# Patient Record
Sex: FEMALE | Race: AMERICAN INDIAN OR ALASKA NATIVE | NOT HISPANIC OR LATINO | Employment: OTHER | URBAN - METROPOLITAN AREA
[De-identification: names, ages, dates, MRNs, and addresses within clinical notes are randomized per-mention and may not be internally consistent; named-entity substitution may affect disease eponyms.]

---

## 2018-11-23 ENCOUNTER — APPOINTMENT (OUTPATIENT)
Dept: RADIOLOGY | Facility: MEDICAL CENTER | Age: 63
End: 2018-11-23
Attending: EMERGENCY MEDICINE
Payer: MEDICARE

## 2018-11-23 ENCOUNTER — HOSPITAL ENCOUNTER (OUTPATIENT)
Dept: RADIOLOGY | Facility: MEDICAL CENTER | Age: 63
End: 2018-11-23

## 2018-11-23 ENCOUNTER — HOSPITAL ENCOUNTER (EMERGENCY)
Facility: MEDICAL CENTER | Age: 63
End: 2018-11-23
Attending: EMERGENCY MEDICINE
Payer: MEDICARE

## 2018-11-23 VITALS
OXYGEN SATURATION: 100 % | RESPIRATION RATE: 10 BRPM | TEMPERATURE: 98 F | SYSTOLIC BLOOD PRESSURE: 97 MMHG | DIASTOLIC BLOOD PRESSURE: 63 MMHG | BODY MASS INDEX: 19.83 KG/M2 | WEIGHT: 105 LBS | HEIGHT: 61 IN | HEART RATE: 81 BPM

## 2018-11-23 DIAGNOSIS — S53.104A CLOSED DISLOCATION OF RIGHT ELBOW, INITIAL ENCOUNTER: ICD-10-CM

## 2018-11-23 PROCEDURE — 96375 TX/PRO/DX INJ NEW DRUG ADDON: CPT

## 2018-11-23 PROCEDURE — 24600 TX CLSD ELBOW DISLC W/O ANES: CPT

## 2018-11-23 PROCEDURE — 73070 X-RAY EXAM OF ELBOW: CPT | Mod: RT

## 2018-11-23 PROCEDURE — 302875 HCHG BANDAGE ACE 4 OR 6""

## 2018-11-23 PROCEDURE — 96365 THER/PROPH/DIAG IV INF INIT: CPT

## 2018-11-23 PROCEDURE — 29105 APPLICATION LONG ARM SPLINT: CPT

## 2018-11-23 PROCEDURE — 700111 HCHG RX REV CODE 636 W/ 250 OVERRIDE (IP): Performed by: EMERGENCY MEDICINE

## 2018-11-23 PROCEDURE — 99285 EMERGENCY DEPT VISIT HI MDM: CPT

## 2018-11-23 PROCEDURE — 700105 HCHG RX REV CODE 258: Performed by: EMERGENCY MEDICINE

## 2018-11-23 RX ORDER — AMITRIPTYLINE HYDROCHLORIDE 100 MG/1
100 TABLET ORAL NIGHTLY
COMMUNITY

## 2018-11-23 RX ORDER — GABAPENTIN 100 MG/1
100 CAPSULE ORAL 3 TIMES DAILY
COMMUNITY

## 2018-11-23 RX ORDER — SODIUM CHLORIDE 9 MG/ML
1000 INJECTION, SOLUTION INTRAVENOUS ONCE
Status: COMPLETED | OUTPATIENT
Start: 2018-11-23 | End: 2018-11-23

## 2018-11-23 RX ORDER — MIDAZOLAM HYDROCHLORIDE 1 MG/ML
INJECTION INTRAMUSCULAR; INTRAVENOUS
Status: DISCONTINUED
Start: 2018-11-23 | End: 2018-11-23

## 2018-11-23 RX ORDER — MIDAZOLAM HYDROCHLORIDE 1 MG/ML
INJECTION INTRAMUSCULAR; INTRAVENOUS
Status: DISCONTINUED
Start: 2018-11-23 | End: 2018-11-24 | Stop reason: HOSPADM

## 2018-11-23 RX ORDER — CITALOPRAM 20 MG/1
20 TABLET ORAL DAILY
COMMUNITY

## 2018-11-23 RX ORDER — MIDAZOLAM HYDROCHLORIDE 1 MG/ML
5 INJECTION INTRAMUSCULAR; INTRAVENOUS ONCE
Status: COMPLETED | OUTPATIENT
Start: 2018-11-23 | End: 2018-11-23

## 2018-11-23 RX ORDER — FAMOTIDINE 20 MG/1
20 TABLET, FILM COATED ORAL 2 TIMES DAILY
COMMUNITY

## 2018-11-23 RX ADMIN — FENTANYL CITRATE 50 MCG: 50 INJECTION, SOLUTION INTRAMUSCULAR; INTRAVENOUS at 20:43

## 2018-11-23 RX ADMIN — PROPOFOL 20 MG: 10 INJECTION, EMULSION INTRAVENOUS at 21:00

## 2018-11-23 RX ADMIN — FENTANYL CITRATE 50 MCG: 50 INJECTION, SOLUTION INTRAMUSCULAR; INTRAVENOUS at 20:57

## 2018-11-23 RX ADMIN — SODIUM CHLORIDE 1000 ML: 9 INJECTION, SOLUTION INTRAVENOUS at 20:41

## 2018-11-23 RX ADMIN — MIDAZOLAM 5 MG: 1 INJECTION INTRAMUSCULAR; INTRAVENOUS at 20:59

## 2018-11-23 ASSESSMENT — PAIN SCALES - GENERAL
PAINLEVEL_OUTOF10: 8
PAINLEVEL_OUTOF10: 8

## 2018-11-24 NOTE — H&P
Surgery Orthopedic History & Physical Note    Date  11/23/2018    Primary Care Physician  No primary care provider on file.    CC  Right elbow dislocation    HPI  This is a 63 y.o. Right hand dominant female who presented as a transfer from [facility] with a right elbow dislocation. Reports a fall yesterday, noticed pain and discomfort today and presented to ED in [place]. Upon questioning patient is unsure of exact time frame of injury. Attempts at closed reduction were unsuccessful and patient was transferred to [facility] for further eval. Currently c/o right elbow pain, denies numbness/tingling. Admits > 10 alcoholic drinks per week, usually in binge fashion. Denies tobacco use or ilicit drugs. Also reports left hand pain,     Past Medical History:   Diagnosis Date   • Nerve injury     Unspecified back and head nerve damage       History reviewed. No pertinent surgical history.    Current Facility-Administered Medications   Medication Dose Route Frequency Provider Last Rate Last Dose   • NS infusion 1,000 mL  1,000 mL Intravenous Once William Foster M.D.       • propofol (DIPRIVAN) injection  40 mcg/kg/min Intravenous Once William Foster M.D.       • fentaNYL (SUBLIMAZE) injection 50 mcg  50 mcg Intravenous Once William Foster M.D.         Current Outpatient Prescriptions   Medication Sig Dispense Refill   • gabapentin (NEURONTIN) 100 MG Cap Take 100 mg by mouth 3 times a day.     • famotidine (PEPCID) 20 MG Tab Take 20 mg by mouth 2 times a day.     • citalopram (CELEXA) 20 MG Tab Take 20 mg by mouth every day.     • amitriptyline (ELAVIL) 100 MG Tab Take 100 mg by mouth every evening.         Social History     Social History   • Marital status: N/A     Spouse name: N/A   • Number of children: N/A   • Years of education: N/A     Occupational History   • Not on file.     Social History Main Topics   • Smoking status: Current Some Day Smoker   • Smokeless tobacco: Current User   • Alcohol use No       Comment: social   • Drug use: No   • Sexual activity: Not on file     Other Topics Concern   • Not on file     Social History Narrative   • No narrative on file       History reviewed. No pertinent family history.    Allergies  Latex and Sulfur    Review of Systems  Negative except for HPI    Physical Exam    Vital Signs  Blood Pressure : (!) 97/63   Temperature: 36.7 °C (98 °F)   Pulse: 90   Respiration: 18         NAD  A & O x 3  Right UE:  Elbow with obvious deformity c/w posterior dislocation  + wrist flexion/extension, digits flex/extend.   SILT median/radial/ulnar  +radial pulse  No open wound noted  Left hand without significant tenderness.   Labs:                    Radiology:  OUTSIDE IMAGES-DX LOWER EXTREMITY, RIGHT   Final Result      OUTSIDE IMAGES-DX UPPER EXTREMITY, LEFT   Final Result      OUTSIDE IMAGES-DX UPPER EXTREMITY, RIGHT   Final Result      OUTSIDE IMAGES-DX UPPER EXTREMITY, RIGHT   Final Result            Assessment/Plan:  Right elbow posterior dislocation after fall ~24 hours ago or longer; will attempt closed reduction under sedation in ED. Outside films do show possible coronoid avulsion. If successful will plan for posterior mold splint and sling. If unsuccessful will plan for admission and open reduction in OR tomorrow am. Explained R/B/A to patient, who expresses understanding. Patient also has acute vs. Chronic left 3rd metacarpal fracture. No swelling or tenderness noted.       Addendum:  Post reduction films show reduction of posterior elbow dislocation. Patient placed in posterior splint and will be discharged from ED. Follow up @ [facility] in 10-14 days. NWB RUE. Can be weight bearing as tolerated on left hand, allow pain to dictate activity.

## 2018-11-24 NOTE — ED PROVIDER NOTES
ED Provider Note    CHIEF COMPLAINT  Chief Complaint   Patient presents with   • Arm Injury     Right forearm and elbow fracture.  Left hand fx.  Pt is a transfer from [facility].  Reduction attempt failed at previous facility       HPI  Vanessa Wong is a 63 y.o. female who presents via transfer for evaluation of a right elbow dislocation, yesterday morning about 36 hours ago the patient fell while reorganizing her garage, landed on the floor, there is a fall from approximately 5 feet or so.  Had immediate pain in the elbow as well as left hand, waited a day to go to the emergency department where she was diagnosed with the elbow dislocation and possible avulsion fracture within the joint space.  There was an unsuccessful attempt at reduction there at the outside facility so she was sent here for orthopedic consultation.  The x-ray revealed a left fifth metacarpal fracture as well, she has no head injury or neck pain or abdominal pain or chest pain, no other injuries.  She last ate this morning approximately 12 hours ago although 4 hours ago she had some water.    REVIEW OF SYSTEMS  Negative for headache, acute neck pain, acute back pain, chest pain, abdominal pain, weakness, numbness or tingling.  All other systems are negative.    PAST MEDICAL HISTORY  Past Medical History:   Diagnosis Date   • Nerve injury     Unspecified back and head nerve damage       FAMILY HISTORY  History reviewed. No pertinent family history.    SOCIAL HISTORY  Social History   Substance Use Topics   • Smoking status: Current Some Day Smoker   • Smokeless tobacco: Current User   • Alcohol use No      Comment: social       SURGICAL HISTORY  History reviewed. No pertinent surgical history.    CURRENT MEDICATIONS  I personally reviewed the medication list in the charting documentation.     ALLERGIES  Allergies   Allergen Reactions   • Latex    • Sulfur        MEDICAL RECORD  I have reviewed patient's medical record and pertinent results  "are listed above.      PHYSICAL EXAM  VITAL SIGNS: BP (!) 97/63   Pulse 90   Temp 36.7 °C (98 °F) (Temporal)   Resp 18   Ht 1.549 m (5' 1\")   Wt 47.6 kg (105 lb)   BMI 19.84 kg/m²    Constitutional: Well appearing patient in no acute distress.  Not toxic, nor ill in appearance.  HENT: Mucus membranes moist.    Eyes: No scleral icterus. Normal conjunctiva   Neck: Supple, comfortable, nonpainful range of motion.   Cardiovascular: Regular heart rate and rhythm.   Thorax & Lungs: Chest is nontender.  Lungs are clear to auscultation with good air movement bilaterally.  No wheeze, rhonchi, nor rales.   Abdomen: Soft, with no tenderness, rebound nor guarding.  No mass or pulsatile mass appreciated.  Skin: Warm, dry. No rash appreciated  Extremities/Musculoskeletal: Right upper extremity has a long posterior splint in place, normal sensation in all III nerve distributions as well as cap refill appreciated.  The left hand has appreciable edema and tenderness overlying the ulnar aspect, neurovascularly intact distally  Neurologic: Alert & oriented. No focal deficits observed.   Psychiatric: Normal affect appropriate for the clinical situation.    DIAGNOSTIC STUDIES / PROCEDURES    RADIOLOGY  DX-ELBOW-LIMITED 2- RIGHT   Final Result         1. Successful air reduction of posterior elbow dislocation.   2. Several tiny avulsion fracture fragments anterior to the distal humerus.      OUTSIDE IMAGES-DX LOWER EXTREMITY, RIGHT   Final Result      OUTSIDE IMAGES-DX UPPER EXTREMITY, LEFT   Final Result      OUTSIDE IMAGES-DX UPPER EXTREMITY, RIGHT   Final Result      OUTSIDE IMAGES-DX UPPER EXTREMITY, RIGHT   Final Result              Conscious Sedation Procedure Note    Indication: Elbow dislocation    Consent: I have discussed with the patient and/or the patient representative the indication, alternatives, and the possible risks and/or complications of the planned procedure and the anesthesia methods. The patient and/or " patient representative appear to understand and agree to proceed.    Physician Involvement: The attending physician was present and supervising this procedure.    Pre-Sedation Documentation and Exam: I have personally completed a history, physical exam & review of systems for this patient (see notes).  Airway Assessment: Mallampati Class II - (soft palate, fauces & uvula are visible)    Prior History of Anesthesia Complications: none    ASA Classification: Class 2 - A normal healthy patient with mild systemic disease    Sedation/ Anesthesia Plan: intravenous sedation    Medications Used: Primarily fentanyl and Versed with small amount of propofol    Monitoring and Safety: The patient was placed on a cardiac monitor and vital signs, pulse oximetry and level of consciousness were continuously evaluated throughout the procedure. The patient was closely monitored until recovery from the medications was complete and the patient had returned to baseline status. Respiratory therapy was on standby at all times during the procedure.    (The following sections must be completed)  Post-Sedation Vital Signs: Vital signs were reviewed and were stable after the procedure (see flow sheet for vitals)            Post-Sedation Exam: Lungs: clear and Cardiovascular: normal           Complications: none    Total time of the bedside: 24 minutes        COURSE & MEDICAL DECISION MAKING  I have reviewed any medical record information, laboratory studies and radiographic results as noted above.    Encounter Summary: This is a 63 y.o. female with a right elbow dislocation, transferred here after an unsuccessful reduction tap at the outside facility, she actually fell about 36 hours ago leading to this injury.  Has a contralateral hand fracture, she is neurovascularly intact with no other injuries, I consulted Dr. Francis, orthopedics who will come evaluate the patient, the plan as of now is a bedside conscious sedation as well as reduction  ------ conscious sedation proceeded uneventfully with successful reduction of the elbow confirmed by x-ray, patient has been reevaluated and is awake and alert and speaking, she is being discharged home once a ride arrives to drive her she will follow-up with the orthopedic surgeon.      DISPOSITION: Discharged home in stable condition      FINAL IMPRESSION  1. Closed dislocation of right elbow, initial encounter           This dictation was created using voice recognition software. The accuracy of the dictation is limited to the abilities of the software. I expect there may be some errors of grammar and possibly content. The nursing notes were reviewed and certain aspects of this information were incorporated into this note.    Electronically signed by: William Foster, 11/23/2018 8:28 PM

## 2018-11-24 NOTE — ED NOTES
Assist RN: Elbow reduction performed at bedside by Dr. Fracnis with Dr. Foster directing conscious sedation. Medications titrated by ERP. Procedure began at 2056. Pt tolerated well, and vital signs remained stable. RT at BS. Tech splinting elbow. XRAY called.

## 2018-11-24 NOTE — OP REPORT
DATE OF SERVICE:  11/23/2018    PREOPERATIVE DIAGNOSIS:  Right elbow posterior dislocation.    POSTOPERATIVE DIAGNOSIS:  Right elbow posterior dislocation.    PROCEDURE PERFORMED:  Right elbow closed reduction under anesthesia/conscious   sedation.    ESTIMATED BLOOD LOSS:  None.    SURGEON:  Torito Francis MD    ASSISTANT:  William Foster MD    INDICATIONS FOR PROCEDURE:  This patient is a 63-year-old female transferred   from [facility] with a posterior simple elbow dislocation.  She had failed   several reduction attempts up at the outside hospital.  She was transferred   for further care.  She was indicated for a repeat attempt at a closed   reduction prior to possible open reduction.  The risks, benefits and   alternatives were explained to her.  She expressed an understanding and wished   to proceed.    DESCRIPTION OF PROCEDURE:  After induction of conscious sedation, the patient   was stabilized in her shoulders.  The elbow was first extended and   hypersupinated.  We then applied axial traction and pressure to the olecranon   tip while flexing the elbow an audible clunk was heard as the elbow reduced   and it was stable past 90 degrees of flexion to full extension.  She was noted   to have a palpable radial pulse.  She was awakened from anesthesia and noted   to be neurovascularly intact distally.  A posterior mold splint was placed   with the patient in flexion.  This was well padded at all appropriate   locations.  She was then monitored for an appropriate amount of time.  She can   be discharged from the emergency department and follow up as an outpatient.       ____________________________________     MD ANDREW Carlton / PATEL    DD:  11/23/2018 21:21:20  DT:  11/23/2018 21:34:55    D#:  [id number]  Job#:  [id number]

## 2018-11-24 NOTE — ED TRIAGE NOTES
Vanessa Wong  63 y.o. female  Chief Complaint   Patient presents with   • Arm Injury     Right forearm and elbow fracture.  Left hand fx.  Pt is a transfer from [facility].  Reduction attempt failed at previous facility       Bib ems for above.  Pt obtained the injuries secondary to a fall.  Denies etoh, denies syncope, denies hitting head.  Pt amb with a steady gait, and is A/Ox4. Chart up for ERP

## 2019-01-11 ENCOUNTER — HOSPITAL ENCOUNTER (EMERGENCY)
Facility: MEDICAL CENTER | Age: 64
End: 2019-01-11
Attending: EMERGENCY MEDICINE
Payer: MEDICARE

## 2019-01-11 ENCOUNTER — APPOINTMENT (OUTPATIENT)
Dept: RADIOLOGY | Facility: MEDICAL CENTER | Age: 64
End: 2019-01-11
Attending: EMERGENCY MEDICINE
Payer: MEDICARE

## 2019-01-11 VITALS
HEIGHT: 61 IN | RESPIRATION RATE: 18 BRPM | TEMPERATURE: 97 F | HEART RATE: 76 BPM | DIASTOLIC BLOOD PRESSURE: 74 MMHG | BODY MASS INDEX: 22.06 KG/M2 | SYSTOLIC BLOOD PRESSURE: 115 MMHG | OXYGEN SATURATION: 66 % | WEIGHT: 116.84 LBS

## 2019-01-11 DIAGNOSIS — F10.920 ALCOHOLIC INTOXICATION WITHOUT COMPLICATION (HCC): ICD-10-CM

## 2019-01-11 DIAGNOSIS — S09.90XA CLOSED HEAD INJURY, INITIAL ENCOUNTER: ICD-10-CM

## 2019-01-11 PROCEDURE — 70450 CT HEAD/BRAIN W/O DYE: CPT

## 2019-01-11 PROCEDURE — 99284 EMERGENCY DEPT VISIT MOD MDM: CPT | Mod: 25

## 2019-01-11 NOTE — ED TRIAGE NOTES
Patient presents to ED via EMS with c/o GLF/ head injury. EMS reports patient and her spouse were staying at the R and drinking heavily. States that patient had unwitnessed fall onto the bar and hit her head. Denies LOC. EMS reports patient could not walk on scene and did not want to come to ED. Patient accompanied by her spouse Ray at bedside

## 2019-01-11 NOTE — ED PROVIDER NOTES
ED Provider Note    Scribed for Raji Hargrove M.D. by Maria C Maurer. 1/11/2019  9:09 AM      Means of arrival: Ambulance  History limited by: None    CHIEF COMPLAINT  Chief Complaint   Patient presents with   • GLF   • Alcohol Intoxication   • Head Injury       HPI  Vanessa Wong is a 63 y.o. female who presents to the ED for evaluation following ground level fall. The patient reports she was at the [facility] bar when she caught her foot on the barstool, fell, and hit her head. The patient denies loss of consciousness however fall was unwitnessed. Per EMS report, patient was  drinking alcohol at the [facility] with her  today however she denies any heavy drinking last night. Patient is aware she experienced a fall however she is unsure at what time this occurred and does not know the current time. She reports she consumes 4-6 alcoholic drinks a week. Patient denies shortness of breath, chest pains, facial pain, abdominal pain, nausea, vomiting, diarrhea, focal numbness or weakness, headache, or extremity pain.       REVIEW OF SYSTEMS  CONSTITUTIONAL:  Denies weakness.  CARDIOVASCULAR:  Denies chest pain  RESPIRATORY:  Denies shortness of breath  GI:  Denies abdominal pain, nausea, vomiting, or diarrhea.  MUSCULOSKELETAL:  Denies weakness and extremity pain.   NEUROLOGIC:  Denies headache, focal weakness, or numbness.  All other systems negative.       PAST MEDICAL HISTORY  Past Medical History:   Diagnosis Date   • Nerve injury     Unspecified back and head nerve damage       SOCIAL HISTORY   reports that she has been smoking.  She uses smokeless tobacco. She reports that she does not drink alcohol or use drugs.    SURGICAL HISTORY  No recent surgeries.     CURRENT MEDICATIONS  Home Medications     Reviewed by Claudia Ravi R.N. (Registered Nurse) on 01/11/19 at 0857  Med List Status: Partial   Medication Last Dose Status   amitriptyline (ELAVIL) 100 MG Tab  Active   citalopram (CELEXA) 20 MG Tab  Active  "  famotidine (PEPCID) 20 MG Tab  Active   gabapentin (NEURONTIN) 100 MG Cap  Active                ALLERGIES  Allergies   Allergen Reactions   • Latex    • Sulfur        PHYSICAL EXAM  VITAL SIGNS: /92   Pulse 83   Temp 36.1 °C (97 °F) (Tympanic)   Resp 18   Ht 1.549 m (5' 1\")   Wt 53 kg (116 lb 13.5 oz)   BMI 22.08 kg/m²      Constitutional: Patient is awake and alert. Speech is clear and answers questions. No acute respiratory distress. Well developed, Well nourished, smells of alcohol  HENT: Large hematoma to left temporal area, no Macias sign or raccoon eyes, Normocephalic, Bilateral external ears normal, Oropharynx dry and pink with no exudates, Nose patent.  Eyes: PERRLA, EOMI, Sclera and conjunctiva injected, No discharge.   Neck:  Supple no nuchal rigidity, no thyromegaly or mass. Non-tender  Lymphatic: No supraclavicular lymph nodes.   Cardiovascular: Heart is regular rate and rhythm no murmur,  Thorax & Lungs: Chest is symmetrical, with good breath sounds. No wheezing or crackles. No respiratory distress, No chest tenderness.   Abdomen: Soft, No tenderness no hepatosplenomegaly there is no guarding or rebound, No masses, No pulsatile masses.   Skin: Warm, Dry, no petechia, purpura, or rash.   Extremities: No edema. Non tender.   Musculoskeletal: Good range of motion to wrists, elbows, shoulders, hips, knees, and ankles. Pulses 2+ radially and femorally. No gross deformities noted.   Neurologic: Alert & oriented to person, time, and place.  Strength is 5 over 5 and symmetric in bilateral upper and lower extremities.  Sensory is intact to light touch to face, arms, and legs.   Psychiatric: Normal affect.      RADIOLOGY/PROCEDURES  CT-HEAD W/O   Final Result      No acute intracranial abnormality is identified.      Trace fluid in the mastoid air cells on the right.      Left parietal scalp hematoma.        The radiologist's interpretations of all radiological studies have been reviewed by me. "     COURSE & MEDICAL DECISION MAKING  Pertinent Labs & Imaging studies reviewed. (See chart for details)    Differential diagnoses include but are not limited to scalp contusion. skull fracture, intracranial bleeding, and alcohol intoxication.     9:09 AM - Patient seen and examined at bedside. Ordered CT head. I informed the patient CT scan is necessary to check for any intracranial process. Patient is understanding and agreeable to plan.     10:08 AM Patient was being taken to CT, and refused, wishing to be discharged. I informed her that secondary to her intoxication and high risk for intracranial process, I cannot allow her to leave at this time. She is reluctant but agreeable.    10:35 AM - Discussed with patient her workup, negative at this time for emergent processes. Patient comfortable with discharge home. Ambulatory without problems in the ED.       Decision Making  Patient's  stated that she had been drinking all night.  She filled out at the bar.  She has a large cephalhematoma on the left side.  Otherwise she seem to be doing quite well.  Although she did not remember what happened to her did not know the time of day.  Finally convinced her to get a CAT scan of her head.  This fortunately was normal with no intracranial bleeding.  She is being discharged in stable condition with her .  She is been up ambulatory here in the emergency room without problems at all.  She understands she needs to stop drinking alcohol.  We will follow-up with her primary care doctor up in [facility] this week.  The patient will return for new or worsening symptoms and is stable at the time of discharge.        DISPOSITION:  Patient will be discharged home in stable condition.    FOLLOW UP:  pcp  pcp in [facility]. within 3 days          FINAL IMPRESSION  1. Closed head injury, initial encounter    2. Alcoholic intoxication without complication (HCC)    3. Return to the emergency department for increased  pains, fevers, vomiting or change in condition.     IMaria C (Scribarslan), am scribing for, and in the presence of, Raji Hargrove M.D..    Electronically signed by: Maria C Maurer (Kym), 1/11/2019    Raji HA M.D. personally performed the services described in this documentation, as scribed by Maria C Maurer in my presence, and it is both accurate and complete. C.     The note accurately reflects work and decisions made by me.  Raji Hargrove  1/11/2019  1:51 PM

## 2025-03-02 ENCOUNTER — HOSPITAL ENCOUNTER (INPATIENT)
Facility: MEDICAL CENTER | Age: 70
LOS: 2 days | DRG: 897 | End: 2025-03-04
Attending: STUDENT IN AN ORGANIZED HEALTH CARE EDUCATION/TRAINING PROGRAM | Admitting: HOSPITALIST
Payer: MEDICARE

## 2025-03-02 DIAGNOSIS — F10.931 ALCOHOL WITHDRAWAL SEIZURE, WITH DELIRIUM (HCC): ICD-10-CM

## 2025-03-02 DIAGNOSIS — R56.9 SEIZURE (HCC): ICD-10-CM

## 2025-03-02 DIAGNOSIS — R56.9 ALCOHOL WITHDRAWAL SEIZURE, WITH DELIRIUM (HCC): ICD-10-CM

## 2025-03-02 PROBLEM — G93.40 ACUTE ENCEPHALOPATHY: Status: ACTIVE | Noted: 2025-03-02

## 2025-03-02 PROBLEM — E87.6 HYPOKALEMIA: Status: ACTIVE | Noted: 2025-03-02

## 2025-03-02 PROBLEM — F32.A DEPRESSION: Status: ACTIVE | Noted: 2025-03-02

## 2025-03-02 PROBLEM — D64.9 ANEMIA: Status: ACTIVE | Noted: 2025-03-02

## 2025-03-02 LAB
ALBUMIN SERPL BCP-MCNC: 3 G/DL (ref 3.2–4.9)
ALBUMIN/GLOB SERPL: 1.1 G/DL
ALP SERPL-CCNC: 87 U/L (ref 30–99)
ALT SERPL-CCNC: 13 U/L (ref 2–50)
ANION GAP SERPL CALC-SCNC: 12 MMOL/L (ref 7–16)
ANION GAP SERPL CALC-SCNC: 8 MMOL/L (ref 7–16)
AST SERPL-CCNC: 37 U/L (ref 12–45)
BASOPHILS # BLD AUTO: 0.4 % (ref 0–1.8)
BASOPHILS # BLD: 0.02 K/UL (ref 0–0.12)
BILIRUB SERPL-MCNC: 0.9 MG/DL (ref 0.1–1.5)
BUN SERPL-MCNC: 5 MG/DL (ref 8–22)
BUN SERPL-MCNC: 5 MG/DL (ref 8–22)
CALCIUM ALBUM COR SERPL-MCNC: 8.1 MG/DL (ref 8.5–10.5)
CALCIUM SERPL-MCNC: 7.3 MG/DL (ref 8.5–10.5)
CALCIUM SERPL-MCNC: 7.5 MG/DL (ref 8.5–10.5)
CHLORIDE SERPL-SCNC: 110 MMOL/L (ref 96–112)
CHLORIDE SERPL-SCNC: 112 MMOL/L (ref 96–112)
CK SERPL-CCNC: 62 U/L (ref 0–154)
CO2 SERPL-SCNC: 17 MMOL/L (ref 20–33)
CO2 SERPL-SCNC: 18 MMOL/L (ref 20–33)
CREAT SERPL-MCNC: 0.54 MG/DL (ref 0.5–1.4)
CREAT SERPL-MCNC: 0.6 MG/DL (ref 0.5–1.4)
EOSINOPHIL # BLD AUTO: 0.01 K/UL (ref 0–0.51)
EOSINOPHIL NFR BLD: 0.2 % (ref 0–6.9)
ERYTHROCYTE [DISTWIDTH] IN BLOOD BY AUTOMATED COUNT: 51 FL (ref 35.9–50)
FERRITIN SERPL-MCNC: 146 NG/ML (ref 10–291)
FOLATE SERPL-MCNC: 18.2 NG/ML
GFR SERPLBLD CREATININE-BSD FMLA CKD-EPI: 96 ML/MIN/1.73 M 2
GFR SERPLBLD CREATININE-BSD FMLA CKD-EPI: 98 ML/MIN/1.73 M 2
GLOBULIN SER CALC-MCNC: 2.8 G/DL (ref 1.9–3.5)
GLUCOSE SERPL-MCNC: 81 MG/DL (ref 65–99)
GLUCOSE SERPL-MCNC: 86 MG/DL (ref 65–99)
HCT VFR BLD AUTO: 24.2 % (ref 37–47)
HGB BLD-MCNC: 8.2 G/DL (ref 12–16)
HGB RETIC QN AUTO: 38.6 PG/CELL (ref 29–35)
IMM GRANULOCYTES # BLD AUTO: 0.02 K/UL (ref 0–0.11)
IMM GRANULOCYTES NFR BLD AUTO: 0.4 % (ref 0–0.9)
IMM RETICS NFR: 10.8 % (ref 2.6–16.1)
IRON SATN MFR SERPL: 60 % (ref 15–55)
IRON SERPL-MCNC: 127 UG/DL (ref 40–170)
LACTATE SERPL-SCNC: 0.6 MMOL/L (ref 0.5–2)
LACTATE SERPL-SCNC: 0.9 MMOL/L (ref 0.5–2)
LYMPHOCYTES # BLD AUTO: 1.36 K/UL (ref 1–4.8)
LYMPHOCYTES NFR BLD: 28.6 % (ref 22–41)
MAGNESIUM SERPL-MCNC: 1.7 MG/DL (ref 1.5–2.5)
MAGNESIUM SERPL-MCNC: 2.3 MG/DL (ref 1.5–2.5)
MCH RBC QN AUTO: 34 PG (ref 27–33)
MCHC RBC AUTO-ENTMCNC: 33.9 G/DL (ref 32.2–35.5)
MCV RBC AUTO: 100.4 FL (ref 81.4–97.8)
MONOCYTES # BLD AUTO: 0.59 K/UL (ref 0–0.85)
MONOCYTES NFR BLD AUTO: 12.4 % (ref 0–13.4)
NEUTROPHILS # BLD AUTO: 2.76 K/UL (ref 1.82–7.42)
NEUTROPHILS NFR BLD: 58 % (ref 44–72)
NRBC # BLD AUTO: 0 K/UL
NRBC BLD-RTO: 0 /100 WBC (ref 0–0.2)
NT-PROBNP SERPL IA-MCNC: 2127 PG/ML (ref 0–125)
PHOSPHATE SERPL-MCNC: 2.5 MG/DL (ref 2.5–4.5)
PLATELET # BLD AUTO: 133 K/UL (ref 164–446)
PMV BLD AUTO: 10.2 FL (ref 9–12.9)
POTASSIUM SERPL-SCNC: 2.9 MMOL/L (ref 3.6–5.5)
POTASSIUM SERPL-SCNC: 3.6 MMOL/L (ref 3.6–5.5)
PROCALCITONIN SERPL-MCNC: 0.1 NG/ML
PROT SERPL-MCNC: 5.8 G/DL (ref 6–8.2)
RBC # BLD AUTO: 2.41 M/UL (ref 4.2–5.4)
RETICS # AUTO: 0.04 M/UL (ref 0.04–0.12)
RETICS/RBC NFR: 1.7 % (ref 0.8–2.6)
SODIUM SERPL-SCNC: 138 MMOL/L (ref 135–145)
SODIUM SERPL-SCNC: 139 MMOL/L (ref 135–145)
TIBC SERPL-MCNC: 212 UG/DL (ref 250–450)
TROPONIN T SERPL-MCNC: 20 NG/L (ref 6–19)
TSH SERPL DL<=0.005 MIU/L-ACNC: 0.74 UIU/ML (ref 0.38–5.33)
UIBC SERPL-MCNC: 85 UG/DL (ref 110–370)
VIT B12 SERPL-MCNC: 574 PG/ML (ref 211–911)
WBC # BLD AUTO: 4.8 K/UL (ref 4.8–10.8)

## 2025-03-02 PROCEDURE — 99223 1ST HOSP IP/OBS HIGH 75: CPT | Performed by: HOSPITALIST

## 2025-03-02 PROCEDURE — A9270 NON-COVERED ITEM OR SERVICE: HCPCS

## 2025-03-02 PROCEDURE — 770020 HCHG ROOM/CARE - TELE (206)

## 2025-03-02 PROCEDURE — 82746 ASSAY OF FOLIC ACID SERUM: CPT

## 2025-03-02 PROCEDURE — 82728 ASSAY OF FERRITIN: CPT

## 2025-03-02 PROCEDURE — 85025 COMPLETE CBC W/AUTO DIFF WBC: CPT

## 2025-03-02 PROCEDURE — 83735 ASSAY OF MAGNESIUM: CPT

## 2025-03-02 PROCEDURE — 84100 ASSAY OF PHOSPHORUS: CPT

## 2025-03-02 PROCEDURE — HZ2ZZZZ DETOXIFICATION SERVICES FOR SUBSTANCE ABUSE TREATMENT: ICD-10-PCS | Performed by: HOSPITALIST

## 2025-03-02 PROCEDURE — 84145 PROCALCITONIN (PCT): CPT

## 2025-03-02 PROCEDURE — 83605 ASSAY OF LACTIC ACID: CPT

## 2025-03-02 PROCEDURE — 700111 HCHG RX REV CODE 636 W/ 250 OVERRIDE (IP)

## 2025-03-02 PROCEDURE — A9270 NON-COVERED ITEM OR SERVICE: HCPCS | Performed by: HOSPITALIST

## 2025-03-02 PROCEDURE — 82550 ASSAY OF CK (CPK): CPT

## 2025-03-02 PROCEDURE — 83880 ASSAY OF NATRIURETIC PEPTIDE: CPT

## 2025-03-02 PROCEDURE — 85046 RETICYTE/HGB CONCENTRATE: CPT

## 2025-03-02 PROCEDURE — 83550 IRON BINDING TEST: CPT

## 2025-03-02 PROCEDURE — 700102 HCHG RX REV CODE 250 W/ 637 OVERRIDE(OP): Performed by: HOSPITALIST

## 2025-03-02 PROCEDURE — 84443 ASSAY THYROID STIM HORMONE: CPT

## 2025-03-02 PROCEDURE — 83540 ASSAY OF IRON: CPT

## 2025-03-02 PROCEDURE — 700105 HCHG RX REV CODE 258: Performed by: HOSPITALIST

## 2025-03-02 PROCEDURE — 51798 US URINE CAPACITY MEASURE: CPT

## 2025-03-02 PROCEDURE — 82607 VITAMIN B-12: CPT

## 2025-03-02 PROCEDURE — 80053 COMPREHEN METABOLIC PANEL: CPT

## 2025-03-02 PROCEDURE — 700102 HCHG RX REV CODE 250 W/ 637 OVERRIDE(OP)

## 2025-03-02 PROCEDURE — 80048 BASIC METABOLIC PNL TOTAL CA: CPT

## 2025-03-02 PROCEDURE — 36415 COLL VENOUS BLD VENIPUNCTURE: CPT

## 2025-03-02 PROCEDURE — 84484 ASSAY OF TROPONIN QUANT: CPT

## 2025-03-02 PROCEDURE — 700111 HCHG RX REV CODE 636 W/ 250 OVERRIDE (IP): Performed by: HOSPITALIST

## 2025-03-02 RX ORDER — ONDANSETRON 4 MG/1
4 TABLET, ORALLY DISINTEGRATING ORAL EVERY 4 HOURS PRN
Status: DISCONTINUED | OUTPATIENT
Start: 2025-03-02 | End: 2025-03-04 | Stop reason: HOSPADM

## 2025-03-02 RX ORDER — MAGNESIUM SULFATE HEPTAHYDRATE 40 MG/ML
2 INJECTION, SOLUTION INTRAVENOUS ONCE
Status: COMPLETED | OUTPATIENT
Start: 2025-03-02 | End: 2025-03-02

## 2025-03-02 RX ORDER — LORAZEPAM 2 MG/ML
0.5 INJECTION INTRAMUSCULAR EVERY 4 HOURS PRN
Status: DISCONTINUED | OUTPATIENT
Start: 2025-03-02 | End: 2025-03-03

## 2025-03-02 RX ORDER — LORAZEPAM 2 MG/ML
2 INJECTION INTRAMUSCULAR
Status: DISCONTINUED | OUTPATIENT
Start: 2025-03-02 | End: 2025-03-03

## 2025-03-02 RX ORDER — GABAPENTIN 100 MG/1
100 CAPSULE ORAL 3 TIMES DAILY
Status: DISCONTINUED | OUTPATIENT
Start: 2025-03-02 | End: 2025-03-02

## 2025-03-02 RX ORDER — ONDANSETRON 2 MG/ML
4 INJECTION INTRAMUSCULAR; INTRAVENOUS EVERY 4 HOURS PRN
Status: DISCONTINUED | OUTPATIENT
Start: 2025-03-02 | End: 2025-03-04 | Stop reason: HOSPADM

## 2025-03-02 RX ORDER — IBUPROFEN 800 MG/1
400 TABLET, FILM COATED ORAL EVERY 6 HOURS PRN
Status: DISCONTINUED | OUTPATIENT
Start: 2025-03-02 | End: 2025-03-04 | Stop reason: HOSPADM

## 2025-03-02 RX ORDER — LORAZEPAM 1 MG/1
0.5 TABLET ORAL
Status: COMPLETED | OUTPATIENT
Start: 2025-03-02 | End: 2025-03-03

## 2025-03-02 RX ORDER — OMEPRAZOLE 20 MG/1
20 CAPSULE, DELAYED RELEASE ORAL DAILY
Status: DISCONTINUED | OUTPATIENT
Start: 2025-03-02 | End: 2025-03-04 | Stop reason: HOSPADM

## 2025-03-02 RX ORDER — LORAZEPAM 2 MG/1
2 TABLET ORAL
Status: DISCONTINUED | OUTPATIENT
Start: 2025-03-02 | End: 2025-03-03

## 2025-03-02 RX ORDER — POTASSIUM CHLORIDE 1500 MG/1
40 TABLET, EXTENDED RELEASE ORAL ONCE
Status: COMPLETED | OUTPATIENT
Start: 2025-03-02 | End: 2025-03-02

## 2025-03-02 RX ORDER — LORAZEPAM 1 MG/1
1 TABLET ORAL EVERY 4 HOURS PRN
Status: DISCONTINUED | OUTPATIENT
Start: 2025-03-02 | End: 2025-03-03

## 2025-03-02 RX ORDER — ENOXAPARIN SODIUM 100 MG/ML
40 INJECTION SUBCUTANEOUS DAILY
Status: DISCONTINUED | OUTPATIENT
Start: 2025-03-02 | End: 2025-03-04 | Stop reason: HOSPADM

## 2025-03-02 RX ORDER — LORAZEPAM 1 MG/1
0.5 TABLET ORAL EVERY 4 HOURS PRN
Status: DISCONTINUED | OUTPATIENT
Start: 2025-03-02 | End: 2025-03-03

## 2025-03-02 RX ORDER — CITALOPRAM HYDROBROMIDE 20 MG/1
20 TABLET ORAL DAILY
Status: DISCONTINUED | OUTPATIENT
Start: 2025-03-02 | End: 2025-03-03

## 2025-03-02 RX ORDER — SODIUM CHLORIDE 9 MG/ML
INJECTION, SOLUTION INTRAVENOUS CONTINUOUS
Status: DISCONTINUED | OUTPATIENT
Start: 2025-03-02 | End: 2025-03-04

## 2025-03-02 RX ORDER — LEVETIRACETAM 500 MG/1
500 TABLET ORAL 2 TIMES DAILY
Status: DISCONTINUED | OUTPATIENT
Start: 2025-03-02 | End: 2025-03-02

## 2025-03-02 RX ORDER — LORAZEPAM 2 MG/1
4 TABLET ORAL
Status: DISCONTINUED | OUTPATIENT
Start: 2025-03-02 | End: 2025-03-03

## 2025-03-02 RX ORDER — LORAZEPAM 2 MG/ML
1 INJECTION INTRAMUSCULAR
Status: DISCONTINUED | OUTPATIENT
Start: 2025-03-02 | End: 2025-03-03

## 2025-03-02 RX ORDER — POTASSIUM CHLORIDE 7.45 MG/ML
10 INJECTION INTRAVENOUS
Status: COMPLETED | OUTPATIENT
Start: 2025-03-02 | End: 2025-03-02

## 2025-03-02 RX ORDER — POTASSIUM CHLORIDE 1500 MG/1
40 TABLET, EXTENDED RELEASE ORAL EVERY 6 HOURS
Status: DISCONTINUED | OUTPATIENT
Start: 2025-03-02 | End: 2025-03-02

## 2025-03-02 RX ORDER — LEVETIRACETAM 500 MG/5ML
500 INJECTION, SOLUTION, CONCENTRATE INTRAVENOUS EVERY 12 HOURS
Status: DISCONTINUED | OUTPATIENT
Start: 2025-03-02 | End: 2025-03-03

## 2025-03-02 RX ORDER — LABETALOL HYDROCHLORIDE 5 MG/ML
10 INJECTION, SOLUTION INTRAVENOUS EVERY 4 HOURS PRN
Status: DISCONTINUED | OUTPATIENT
Start: 2025-03-02 | End: 2025-03-04 | Stop reason: HOSPADM

## 2025-03-02 RX ORDER — FAMOTIDINE 20 MG/1
20 TABLET, FILM COATED ORAL 2 TIMES DAILY
Status: DISCONTINUED | OUTPATIENT
Start: 2025-03-02 | End: 2025-03-02

## 2025-03-02 RX ORDER — LORAZEPAM 2 MG/ML
1.5 INJECTION INTRAMUSCULAR
Status: DISCONTINUED | OUTPATIENT
Start: 2025-03-02 | End: 2025-03-03

## 2025-03-02 RX ORDER — THIAMINE HYDROCHLORIDE 100 MG/ML
100 INJECTION, SOLUTION INTRAMUSCULAR; INTRAVENOUS DAILY
Status: DISCONTINUED | OUTPATIENT
Start: 2025-03-02 | End: 2025-03-03

## 2025-03-02 RX ADMIN — POTASSIUM CHLORIDE 10 MEQ: 7.46 INJECTION, SOLUTION INTRAVENOUS at 13:57

## 2025-03-02 RX ADMIN — SODIUM CHLORIDE: 9 INJECTION, SOLUTION INTRAVENOUS at 17:06

## 2025-03-02 RX ADMIN — POTASSIUM CHLORIDE 10 MEQ: 7.46 INJECTION, SOLUTION INTRAVENOUS at 10:18

## 2025-03-02 RX ADMIN — SODIUM CHLORIDE: 9 INJECTION, SOLUTION INTRAVENOUS at 06:14

## 2025-03-02 RX ADMIN — MAGNESIUM SULFATE HEPTAHYDRATE 2 G: 2 INJECTION, SOLUTION INTRAVENOUS at 10:03

## 2025-03-02 RX ADMIN — OMEPRAZOLE 20 MG: 20 CAPSULE, DELAYED RELEASE ORAL at 16:57

## 2025-03-02 RX ADMIN — THIAMINE HYDROCHLORIDE 100 MG: 100 INJECTION, SOLUTION INTRAMUSCULAR; INTRAVENOUS at 09:59

## 2025-03-02 RX ADMIN — GABAPENTIN 100 MG: 100 CAPSULE ORAL at 13:55

## 2025-03-02 RX ADMIN — LEVETIRACETAM 500 MG: 100 INJECTION, SOLUTION INTRAVENOUS at 16:57

## 2025-03-02 RX ADMIN — POTASSIUM CHLORIDE 10 MEQ: 7.46 INJECTION, SOLUTION INTRAVENOUS at 12:16

## 2025-03-02 RX ADMIN — POTASSIUM CHLORIDE 40 MEQ: 1500 TABLET, EXTENDED RELEASE ORAL at 13:55

## 2025-03-02 RX ADMIN — POTASSIUM CHLORIDE 10 MEQ: 7.46 INJECTION, SOLUTION INTRAVENOUS at 11:10

## 2025-03-02 ASSESSMENT — ENCOUNTER SYMPTOMS
HEMOPTYSIS: 0
BACK PAIN: 0
PND: 0
SINUS PAIN: 0
BRUISES/BLEEDS EASILY: 0
CLAUDICATION: 0
VOMITING: 1
EYE PAIN: 0
CONSTIPATION: 0
WHEEZING: 0
DEPRESSION: 1
HEARTBURN: 0
DIARRHEA: 0
FLANK PAIN: 0
SPUTUM PRODUCTION: 0
STRIDOR: 0
NAUSEA: 1
HEADACHES: 1
PHOTOPHOBIA: 0
TREMORS: 0
DIAPHORESIS: 0
WEAKNESS: 0
HALLUCINATIONS: 0
SHORTNESS OF BREATH: 0
MYALGIAS: 0
BLURRED VISION: 0
FEVER: 0
CHILLS: 0
SORE THROAT: 0
VOMITING: 0
DOUBLE VISION: 0
PALPITATIONS: 0
DIZZINESS: 0
DIZZINESS: 1
BLOOD IN STOOL: 0
FALLS: 0
ABDOMINAL PAIN: 0
NECK PAIN: 0
ORTHOPNEA: 0
COUGH: 0
NAUSEA: 0
TINGLING: 0
POLYDIPSIA: 0

## 2025-03-02 ASSESSMENT — LIFESTYLE VARIABLES
ANXIETY: MILDLY ANXIOUS
SUBSTANCE_ABUSE: 1
TREMOR: TREMOR NOT VISIBLE BUT CAN BE FELT, FINGERTIP TO FINGERTIP
ORIENTATION AND CLOUDING OF SENSORIUM: CANNOT DO SERIAL ADDITIONS OR IS UNCERTAIN ABOUT DATE
HEADACHE, FULLNESS IN HEAD: NOT PRESENT
VISUAL DISTURBANCES: NOT PRESENT
TOTAL SCORE: 4
AGITATION: NORMAL ACTIVITY
HEADACHE, FULLNESS IN HEAD: NOT PRESENT
TOTAL SCORE: 3
SUBSTANCE_ABUSE: 0
VISUAL DISTURBANCES: NOT PRESENT
VISUAL DISTURBANCES: NOT PRESENT
TREMOR: TREMOR NOT VISIBLE BUT CAN BE FELT, FINGERTIP TO FINGERTIP
PAROXYSMAL SWEATS: NO SWEAT VISIBLE
ANXIETY: MILDLY ANXIOUS
TOTAL SCORE: 7
VISUAL DISTURBANCES: NOT PRESENT
VISUAL DISTURBANCES: NOT PRESENT
PAROXYSMAL SWEATS: NO SWEAT VISIBLE
PAROXYSMAL SWEATS: NO SWEAT VISIBLE
AUDITORY DISTURBANCES: NOT PRESENT
ANXIETY: MILDLY ANXIOUS
AUDITORY DISTURBANCES: NOT PRESENT
HEADACHE, FULLNESS IN HEAD: NOT PRESENT
HEADACHE, FULLNESS IN HEAD: NOT PRESENT
TREMOR: TREMOR NOT VISIBLE BUT CAN BE FELT, FINGERTIP TO FINGERTIP
AUDITORY DISTURBANCES: NOT PRESENT
ORIENTATION AND CLOUDING OF SENSORIUM: DATE DISORIENTATION BY NO MORE THAN TWO CALENDAR DAYS
NAUSEA AND VOMITING: NO NAUSEA AND NO VOMITING
TREMOR: TREMOR NOT VISIBLE BUT CAN BE FELT, FINGERTIP TO FINGERTIP
ANXIETY: NO ANXIETY (AT EASE)
TOTAL SCORE: 4
TREMOR: NO TREMOR
NAUSEA AND VOMITING: NO NAUSEA AND NO VOMITING
ORIENTATION AND CLOUDING OF SENSORIUM: DATE DISORIENTATION BY MORE THAN TWO CALENDAR DAYS
AUDITORY DISTURBANCES: NOT PRESENT
ANXIETY: MILDLY ANXIOUS
PAROXYSMAL SWEATS: NO SWEAT VISIBLE
NAUSEA AND VOMITING: NO NAUSEA AND NO VOMITING
AUDITORY DISTURBANCES: NOT PRESENT
PAROXYSMAL SWEATS: NO SWEAT VISIBLE
ORIENTATION AND CLOUDING OF SENSORIUM: DATE DISORIENTATION BY NO MORE THAN TWO CALENDAR DAYS
HEADACHE, FULLNESS IN HEAD: NOT PRESENT
AGITATION: NORMAL ACTIVITY
ORIENTATION AND CLOUDING OF SENSORIUM: CANNOT DO SERIAL ADDITIONS OR IS UNCERTAIN ABOUT DATE
NAUSEA AND VOMITING: NO NAUSEA AND NO VOMITING
AGITATION: NORMAL ACTIVITY
NAUSEA AND VOMITING: NO NAUSEA AND NO VOMITING
AGITATION: NORMAL ACTIVITY
AGITATION: MODERATELY FIDGETY AND RESTLESS
TOTAL SCORE: 3

## 2025-03-02 NOTE — NON-PROVIDER
"    [facility] [place] MEDICINE PROGRESS NOTE        Attending:   Adriane Castillo M.d.    Resident:   Swathi Cerna, Student    PATIENT:   Vanessa Wong; [id number]; 1955    ID:   69 y.o. female transferred from [facility] to [facility] for alcohol withdrawal seizures with a PMH of alcohol use disorder and depression. Patient had two witnessed tonic-clonic seizures less than a minute each and was given 2 mg IV Ativan. She was postictal and tremulous with a GCS of 7 for an hour, which improved to a GCS of 10. But she remained confused. She was initially given Keppra but upon chart review findings of alcohol use disorder, Keppra was held. Labs showed decreased hemoglobin, decreased bicarb with normal anion gap, and normal sodium, potassium, and calcium. CTA and CT showed no acute hemorrhages and no vascular occlusion. UA drug screen and alcohol levels were negative. EKG showed sinus tachycardia and LVH.    SUBJECTIVE:   No acute events overnight. Patient wanted to continue sleeping and refused to answer questions.    OBJECTIVE:  Vitals:    03/02/25 0550 03/02/25 0743   BP: 118/50 105/43   Pulse: 73 73   Resp: 17 17   Temp: 36.3 °C (97.3 °F) 36.7 °C (98.1 °F)   TempSrc: Temporal Temporal   SpO2: 97% 95%   Weight: 53 kg (116 lb 13.5 oz)    Height: 1.549 m (5' 1\")        Intake/Output Summary (Last 24 hours) at 3/2/2025 1036  Last data filed at 3/2/2025 1018  Gross per 24 hour   Intake 0 ml   Output 500 ml   Net -500 ml       PHYSICAL EXAM:  General: No acute distress, afebrile, sleeping.  HEENT: NC/AT.  Cardiovascular: RRR without murmurs.   Respiratory: CTAB without rales, cough, wheezing.  EXT:  ZIMMERMAN, no edema.  Skin: No erythema/lesions.   Neuro: Unable to perform due to patient sleeping.    LABS:  Recent Labs     03/02/25  0549   WBC 4.8   RBC 2.41*   HEMOGLOBIN 8.2*   HEMATOCRIT 24.2*   .4*   MCH 34.0*   RDW 51.0*   PLATELETCT 133*   MPV 10.2   NEUTSPOLYS 58.00   LYMPHOCYTES 28.60   MONOCYTES 12.40   EOSINOPHILS " "0.20   BASOPHILS 0.40     Recent Labs     03/02/25  0549   SODIUM 139   POTASSIUM 2.9*   CHLORIDE 110   CO2 17*   BUN 5*   CREATININE 0.60   CALCIUM 7.3*   MAGNESIUM 1.7   PHOSPHORUS 2.5   ALBUMIN 3.0*     Estimated GFR/CRCL = Estimated Creatinine Clearance: 64.9 mL/min (by C-G formula based on SCr of 0.6 mg/dL).  Recent Labs     03/02/25  0549   GLUCOSE 81     Recent Labs     03/02/25  0549   ASTSGOT 37   ALTSGPT 13   TBILIRUBIN 0.9   ALKPHOSPHAT 87   GLOBULIN 2.8     Recent Labs     03/02/25  0549   CPKTOTAL 62         No results for input(s): \"INR\", \"APTT\", \"FIBRINOGEN\" in the last 72 hours.    Invalid input(s): \"DIMER\"      IMAGING:  No orders to display       MEDS:  Current Facility-Administered Medications   Medication Last Admin    NS infusion New Bag at 03/02/25 0614    labetalol (Normodyne/Trandate) injection 10 mg      ondansetron (Zofran) syringe/vial injection 4 mg      Or    ondansetron (Zofran ODT) dispertab 4 mg      citalopram (CeleXA) tablet 20 mg      gabapentin (Neurontin) capsule 100 mg      famotidine (Pepcid) tablet 20 mg      LORazepam (Ativan) tablet 0.5 mg      LORazepam (Ativan) tablet 1 mg      Or    LORazepam (Ativan) injection 0.5 mg      LORazepam (Ativan) tablet 2 mg      Or    LORazepam (Ativan) injection 1 mg      LORazepam (Ativan) tablet 3 mg      Or    LORazepam (Ativan) injection 1.5 mg      LORazepam (Ativan) tablet 4 mg      Or    LORazepam (Ativan) injection 2 mg      thiamine (B-1) injection 100 mg 100 mg at 03/02/25 0959    magnesium sulfate IVPB premix 2 g 2 g at 03/02/25 1003    multivitamin tablet 1 Tablet      potassium chloride (KCL) ivpb 10 mEq 10 mEq at 03/02/25 1018    potassium chloride SA (Kdur) tablet 40 mEq         ASSESSMENT/PLAN:   69 y.o. female transferred from [facility] to [facility] for alcohol withdrawal seizures with a PMH of alcohol use disorder and depression.    Alcohol withdrawal seizure  Assessment & Plan  Patient has a history of  daily " alcohol use and last drink was 3/1. CIWA score of 7 on 3/2.  - CIWA protocol. Continue to monitor,and if increases, can add librium 25 mg q8hrs.  - On 1L NS 83 ml/hr with multivitamin, Mg, thiamine, and folate.    Acute encephalopathy  Assessment & Plan  Multiple possible causes for patient's confusion and difficulty walking upon presentation. Hb decreased on 3/2 at 8.2 with increased MCV of 100.4, so possible B12 and/or B9 deficiency. Due to alcohol use history, possible B1 deficiency. Electrolyte disturbances present with decreased potassium, decreased bicarb, and decreased calcium on 3/2. Postictal state could include confusion, and she received 2 mg Ativan, adding to her somnolent state. Patient is improving, however, so will hold off on brain MRI unless disposition begins to worsen.  - Ordered B12 and ammonia levels.  - Replete calcium with Tums.  - Replete potassium with potassium chloride.   - On 1L NS 83 ml/hr with multivitamin, Mg, thiamine, and folate.    Depression  Assessment & Plan  Patient takes amitriptyline at home.   - Hold amitriptyline since it can lower seizure threshold.  - Start citalopram 20 mg daily.    Elevated troponin and BNP  Assessment & Plan  Troponin 20 and . Patient does not complain of chest pain, SOB, and has no peripheral edema. Heart and lung sounds clear to auscultation. Could have been increased as a cardiac stress response from the multiple seizures.  - Hold off on ECHO or repeat EKG unless patient develops clinical symptoms.    Core Measures:  Fluids: 1L NS 83 ml/hr   Lines: Peripheral IV for intravenous access  Abx: None  Diet: regular diet  PPX: enoxaparin ppx    Disposition  Patient is not medically cleared for discharge.     I have personally seen and examined the patient at bedside. I discussed the plan of care with Adriane Castillo M.d..    CODE Status: Full Code      Swathi Cerna, MS3  [facility]

## 2025-03-02 NOTE — PROGRESS NOTES
Unable to complete med rec at this time. Pt does not know her medications. [facility] not open [phone]. I left message with Pharmacy.

## 2025-03-02 NOTE — PROGRESS NOTES
Daily Progress Note:     Date of Service: 3/2/2025  Primary Team: [facility] IM Green Team   Attending: Eufemia Howard M.D.   Senior Resident: Dr. Marianne Palmer  Intern: Dr. Campo  Contact:  [phone]    Hospital Course:   70 yo F with PMHx depression, no h/o seizure who presented to OSH for confusion, slurred speech and transferred for alcohol withdrawal c/b seizure requiring ativan, phenobarbital and keppra. She was transferred here for higher level of care.  Upon interview with family, patient reports she drinks 3-4 beers/day but doesn't have h/o withdrawal or seizure. Was not significant drinking prior to seizure in ER. She will be continued on keppra, and pending MRI brain and EEG.    OSH w/u: s/p 2L IVF, ativan, phenobarbital, keppra and narcan.  CT  and CTA head were negative for acute hemorrhage or vascular occlussion. Raphael placed at OSH that was removed upon arrival.  WBC 6, Hgb 10, , INR 1.1, glucose 130, BUN 7, Cr 0.6, Na 138, K 3.8, CO2 16, AG 14, AST 55, ALT 23, , ammonia 26  UDS negative  Alcohol negative  EKG sinus tachy with LVH    Interval Update:  NAEO. Patient sleeping on exam but awakens and able to answer yes/no questions and follow commands.    CIWA 7, 4, no ativan required since admission    Consultants/Specialty:  N/a    Review of Systems:  Review of Systems   Constitutional:  Negative for chills and fever.   Respiratory:  Negative for cough and shortness of breath.    Cardiovascular:  Negative for chest pain and palpitations.   Gastrointestinal:  Negative for abdominal pain, nausea and vomiting.   Genitourinary:  Negative for dysuria and urgency.   Musculoskeletal:  Negative for myalgias.   Neurological:  Negative for dizziness.   Psychiatric/Behavioral:  Positive for substance abuse (alcohol).      Objective:   Vitals:   Temp:  [36.3 °C (97.3 °F)-36.7 °C (98.1 °F)] 36.4 °C (97.5 °F)  Pulse:  [73-79] 79  Resp:  [17] 17  BP: (105-118)/(43-50) 113/44  SpO2:  [94 %-97 %] 94  %    Physical Exam  Constitutional:       General: She is not in acute distress.     Appearance: She is ill-appearing (chronic). She is not toxic-appearing or diaphoretic.      Comments: Sleeping on exam but answers yes/no questions, follows commands   HENT:      Head: Normocephalic and atraumatic.      Nose: Nose normal. No rhinorrhea.      Mouth/Throat:      Mouth: Mucous membranes are dry.      Pharynx: Oropharynx is clear.   Eyes:      General: No scleral icterus.     Conjunctiva/sclera: Conjunctivae normal.   Cardiovascular:      Rate and Rhythm: Normal rate and regular rhythm.   Pulmonary:      Effort: Pulmonary effort is normal. No respiratory distress.      Breath sounds: No wheezing.   Abdominal:      General: There is no distension.      Palpations: Abdomen is soft.      Tenderness: There is no abdominal tenderness. There is no guarding or rebound.   Musculoskeletal:      Cervical back: Normal range of motion and neck supple.      Right lower leg: No edema.      Left lower leg: No edema.   Skin:     General: Skin is warm and dry.   Neurological:      Comments: Answers yes/no questions, follows commands       Labs:   Lab Results   Component Value Date/Time    WBC 4.8 03/02/2025 05:49 AM    RBC 2.41 (L) 03/02/2025 05:49 AM    HEMOGLOBIN 8.2 (L) 03/02/2025 05:49 AM    HEMATOCRIT 24.2 (L) 03/02/2025 05:49 AM    .4 (H) 03/02/2025 05:49 AM    MCH 34.0 (H) 03/02/2025 05:49 AM    MCHC 33.9 03/02/2025 05:49 AM    MPV 10.2 03/02/2025 05:49 AM    NEUTSPOLYS 58.00 03/02/2025 05:49 AM    LYMPHOCYTES 28.60 03/02/2025 05:49 AM    MONOCYTES 12.40 03/02/2025 05:49 AM    EOSINOPHILS 0.20 03/02/2025 05:49 AM    BASOPHILS 0.40 03/02/2025 05:49 AM      Lab Results   Component Value Date/Time    SODIUM 139 03/02/2025 05:49 AM    POTASSIUM 2.9 (L) 03/02/2025 05:49 AM    CHLORIDE 110 03/02/2025 05:49 AM    CO2 17 (L) 03/02/2025 05:49 AM    GLUCOSE 81 03/02/2025 05:49 AM    BUN 5 (L) 03/02/2025 05:49 AM    CREATININE 0.60  "03/02/2025 05:49 AM      No results found for: \"ZUEPC71R\", \"VHVXCP551Z\", \"RXHEN100Z\", \"ARTHCO3\", \"ARTBE\", \"GAPBG\", \"TIX4XNG1\"    No results found for: \"PROTHROMBTM\", \"INR\"     Assessment and Plan:  * Seizure (HCC)  Assessment & Plan  The patient had 2 seizure events that lasted for less than 1 minute per OSH s/p keppra 1500mg, phenobarbital, lorazepam 2mg  Originally thought to be alcohol related due to OSH report of PMHx ETOH withdrawal c/b seizures and frequent ED visits for intoxication  CT and CTA head negative for acute hemorrhage or vascular occlussion  Reported CIWA 17 prior to transfer  After d/w family at bedside, reported that she does have 3-4 beers/day but has never had alcohol withdrawals, never had h/o seizures. Does have h/o multiple head injuries r/t falls. No h/o stroke, other PMHx besides chronic pain and depression. Only takes ibuprofen, amitriptyline and sleep aid OTC daily. No other significant PMHx. No h/o alcohol withdrawal. UDS negative.  Could be secondary to multiple head injuries, previous stroke, medications, alcohol  Continue keppra  EEG, MRI Brain  Continue CIWA for now  Q4h neurochecks    Anemia  Assessment & Plan  In setting of alcohol abuse   No active bleeding  F/u iron studies, b12/folate    Acute encephalopathy  Assessment & Plan  Likely r/t seizure w/ postictal s/p ativan, phenobarb, keppra  F/u B12, ammonia  IV thiamine    Hypokalemia  Assessment & Plan  2.9, in setting of alcohol withdrawal  Repleted and follow repeat BMP    Depression  Assessment & Plan  Holding amitriptyline since it can lower the seizure threshold  Continue citalopram      Code Status: FULL  DVT prophylaxis: enoxaparin  Diet: regular  GI: omeprazole  Disposition: remain inpatient    Marianne Palmer, DO  PGY-3 Internal Medicine   "

## 2025-03-02 NOTE — ASSESSMENT & PLAN NOTE
The patient had 2 seizure events that lasted for less than 1 minute per OSH s/p keppra 1500mg, phenobarbital, lorazepam 2mg  Originally thought to be alcohol related due to OSH report of PMHx ETOH withdrawal c/b seizures and frequent ED visits for intoxication  CT and CTA head negative for acute hemorrhage or vascular occlussion  Reported DONNIE 17 prior to transfer  After d/w family at bedside, reported that she does have 3-4 beers/day but has never had alcohol withdrawals, never had h/o seizures. Does have h/o multiple head injuries r/t falls. No h/o stroke, other PMHx besides chronic pain and depression. Only takes ibuprofen, amitriptyline and sleep aid OTC daily. No other significant PMHx. No h/o alcohol withdrawal. UDS negative.  Could be secondary to multiple head injuries, previous stroke, medications, alcohol  Continue keppra  EEG normal   MRI Brain normal   Q4h neurochecks

## 2025-03-02 NOTE — PROGRESS NOTES
Patient admitted with blackman catheter that was placed in [facility]. No indication to keep blackman catheter in place. Blackman catheter removed at 0630.

## 2025-03-02 NOTE — PROGRESS NOTES
"Received report from night shift RN, Maya . Assumed care of pt at 0645. She is very letharigic, however, can answer some questions. When asked by this RN how she is doing she states, \"I am just waking up\". Pt reports no pain, nausea, vomiting.  She states that she is ok with us giving her care. Ok with staff speaking to Son, Kevin. She is Aox2 (self/place). She is in and out of sleep during assessment with this RN. Attempted to give some oral medications and patient is unable to stay fully awake at this time. MD updated. Updated pt on plan of care. Pt resting comfortably in bed. Educated on use of call light which is placed nearby patient. Not clear at this time if the patient is understanding education and POC. Hourly rounding and continuous pulse ox monitoring in place, O2 saturation at 95 on RA.   "

## 2025-03-02 NOTE — H&P
Hospital Medicine History & Physical Note    Date of Service  3/2/2025    Primary Care Physician  No primary care provider on file.    Consultants      Specialist Names:     Code Status  Full Code    Chief Complaint      History of Presenting Illness  Vanessa Wong is a 69 y.o. female who presented 3/2/2025 with past medical history of depression, alcohol abuse who presents to the hospital for confusion, slurred speech. The patient was found by her friend not making any sense on the phone.  She was found by a family member, and was having difficulty walking.  The patient complains that she has been having nausea, vomiting and diarrhea for the past 1 day.  The patient had a seizure that lasted for less than 1 minute.  The patient was transferred from [facility] where she had another seizure event.  The patient was was found to be postictal and tremulous.  She was given Ativan, phenobarbital and Keppra.  The patient does drink alcohol on a daily basis.  It is unknown when she last had her drink.    I have reviewed the records from [facility]  CTA and CT head was negative for any acute hemorrhage or vascular occlusion.  Raphael was placed in outlying facility.    WBC 6, hemoglobin 10, platelets 164, INR 1.1, glucose 130, BUN 7, creatinine 0.6, sodium 138, potassium 3.8, CO2 16, anion gap 14, calcium 8.9, AST 55, ALT 23, , ammonia 26    Urine drug screen was negative    Alcohol levels were negative    EKG interpreted by me found sinus tachycardia, LVH    I discussed the plan of care with patient.    Review of Systems  Review of Systems   Constitutional:  Negative for chills, diaphoresis, fever and malaise/fatigue.   HENT:  Negative for congestion, ear discharge, ear pain, hearing loss, nosebleeds, sinus pain, sore throat and tinnitus.    Eyes:  Negative for blurred vision, double vision, photophobia and pain.   Respiratory:  Negative for cough, hemoptysis, sputum production, shortness of  breath, wheezing and stridor.    Cardiovascular:  Negative for chest pain, palpitations, orthopnea, claudication, leg swelling and PND.   Gastrointestinal:  Positive for nausea and vomiting. Negative for abdominal pain, blood in stool, constipation, diarrhea, heartburn and melena.   Genitourinary:  Negative for dysuria, flank pain, frequency, hematuria and urgency.   Musculoskeletal:  Negative for back pain, falls, joint pain, myalgias and neck pain.   Skin:  Negative for itching and rash.   Neurological:  Positive for dizziness and headaches. Negative for tingling, tremors and weakness.   Endo/Heme/Allergies:  Negative for environmental allergies and polydipsia. Does not bruise/bleed easily.   Psychiatric/Behavioral:  Positive for depression. Negative for hallucinations, substance abuse and suicidal ideas.        Past Medical History   has a past medical history of Nerve injury.    Surgical History  Medical history reviewed is not pertinent    Family History  Family history reviewed with patient. There is no family history that is pertinent to the chief complaint.     Social History   reports that she has been smoking. She uses smokeless tobacco. She reports that she does not drink alcohol and does not use drugs.    Allergies  Allergies   Allergen Reactions    Latex     Sulfur        Medications  Prior to Admission Medications   Prescriptions Last Dose Informant Patient Reported? Taking?   amitriptyline (ELAVIL) 100 MG Tab   Yes No   Sig: Take 100 mg by mouth every evening.   citalopram (CELEXA) 20 MG Tab   Yes No   Sig: Take 20 mg by mouth every day.   famotidine (PEPCID) 20 MG Tab   Yes No   Sig: Take 20 mg by mouth 2 times a day.   gabapentin (NEURONTIN) 100 MG Cap   Yes No   Sig: Take 100 mg by mouth 3 times a day.      Facility-Administered Medications: None       Physical Exam  Temp:  [36.3 °C (97.3 °F)] 36.3 °C (97.3 °F)  Pulse:  [73] 73  Resp:  [17] 17  BP: (118)/(50) 118/50  SpO2:  [97 %] 97 %             "              Physical Exam  Vitals and nursing note reviewed.   Constitutional:       General: She is not in acute distress.     Appearance: Normal appearance. She is ill-appearing. She is not toxic-appearing or diaphoretic.   HENT:      Head: Normocephalic and atraumatic.      Nose: No congestion or rhinorrhea.      Mouth/Throat:      Pharynx: No oropharyngeal exudate or posterior oropharyngeal erythema.   Eyes:      General: No scleral icterus.  Neck:      Vascular: No carotid bruit or JVD.   Cardiovascular:      Rate and Rhythm: Normal rate and regular rhythm.      Pulses: Normal pulses.      Heart sounds: Normal heart sounds. No murmur heard.     No friction rub. No gallop.   Pulmonary:      Effort: Pulmonary effort is normal. No respiratory distress.      Breath sounds: No stridor. No wheezing, rhonchi or rales.   Abdominal:      General: Abdomen is flat. There is no distension.      Palpations: There is no mass.      Tenderness: There is no abdominal tenderness. There is no left CVA tenderness, guarding or rebound.      Hernia: No hernia is present.   Musculoskeletal:         General: No swelling. Normal range of motion.      Cervical back: No rigidity. No muscular tenderness.      Right lower leg: No edema.      Left lower leg: No edema.   Lymphadenopathy:      Cervical: No cervical adenopathy.   Skin:     General: Skin is warm and dry.      Coloration: Skin is not jaundiced or pale.      Findings: No bruising or erythema.   Neurological:      Mental Status: She is alert. She is disoriented.         Laboratory:          No results for input(s): \"ALTSGPT\", \"ASTSGOT\", \"ALKPHOSPHAT\", \"TBILIRUBIN\", \"DBILIRUBIN\", \"GAMMAGT\", \"AMYLASE\", \"LIPASE\", \"ALB\", \"PREALBUMIN\", \"GLUCOSE\" in the last 72 hours.      No results for input(s): \"NTPROBNP\" in the last 72 hours.      No results for input(s): \"TROPONINT\" in the last 72 hours.    Imaging:  MR-BRAIN-WITH & W/O    (Results Pending)       X-Ray:  I have personally " reviewed the images and compared with prior images.  EKG:  I have personally reviewed the images and compared with prior images.    Assessment/Plan:  Justification for Admission Status  I anticipate this patient will require at least two midnights for appropriate medical management, necessitating inpatient admission because seizures    Patient will need a Telemetry bed on MEDICAL service .  The need is secondary to seizures.    * Alcohol withdrawal seizure, with delirium (HCC)- (present on admission)  Assessment & Plan  Patient will be admitted to the telemetry unit with close cardiac monitoring on CIWA protocol  Started on rally bag, multivitamin, thiamine and folate  Replete electrolytes  Patient has been counseled on alcohol cessation and will be provided with information for outpatient detox facilities      Acute encephalopathy  Assessment & Plan  I have ordered B12, ammonia  Possibly postictal  Patient had mitts on upon arrival to [facility]  IV thiamine    Depression  Assessment & Plan  Hold amitriptyline since it can lower the seizure threshold    Seizure (HCC)  Assessment & Plan  The patient had 2 seizure events that lasted for less than 1 minute  I will hold off on Keppra since this is alcohol related  Obtain MRI of the brain since she does have difficulty walking  Every 4 neurochecks        VTE prophylaxis: SCDs/TEDs

## 2025-03-02 NOTE — PROGRESS NOTES
4 Eyes Skin Assessment Completed by Doris RASCON, RN and Isabela LOWRY RN.    Head Scar  Ears WDL  Nose WDL  Mouth WDL  Neck WDL  Breast/Chest WDL  Shoulder Blades WDL  Spine WDL  (R) Arm/Elbow/Hand Bruising  (L) Arm/Elbow/Hand Bruising  Abdomen Bruising  Groin WDL  Scrotum/Coccyx/Buttocks Redness and Blanching  (R) Leg WDL  (L) Leg WDL  (R) Heel/Foot/Toe Redness and Blanching  (L) Heel/Foot/Toe Redness and Blanching          Devices In Places Tele Box and Pulse Ox      Interventions In Place Pillows and Pressure Redistribution Mattress    Possible Skin Injury No    Pictures Uploaded Into Epic N/A  Wound Consult Placed N/A  RN Wound Prevention Protocol Ordered No

## 2025-03-02 NOTE — ASSESSMENT & PLAN NOTE
Patient will be admitted to the telemetry unit with close cardiac monitoring on CIWA protocol  CIWA protocol discontinued due to low ciwa scores  Patient has been counseled on alcohol cessation and will be provided with information for outpatient detox facilities

## 2025-03-02 NOTE — CARE PLAN
The patient is Stable - Low risk of patient condition declining or worsening    Shift Goals  Clinical Goals: Monitor mentation, monitor vitals, safety, admission profile  Patient Goals: AISHA  Family Goals: POC, treat patient    Progress made toward(s) clinical / shift goals:    Problem: Knowledge Deficit - Standard  Goal: Patient and family/care givers will demonstrate understanding of plan of care, disease process/condition, diagnostic tests and medications  Description: Target End Date:  1-3 days or as soon as patient condition allows    Document in Patient Education    1.  Patient and family/caregiver oriented to unit, equipment, visitation policy and means for communicating concern  2.  Complete/review Learning Assessment  3.  Assess knowledge level of disease process/condition, treatment plan, diagnostic tests and medications  4.  Explain disease process/condition, treatment plan, diagnostic tests and medications  Outcome: Progressing     Problem: Optimal Care for Alcohol Withdrawal  Goal: Optimal Care for the alcohol withdrawal patient  Description: Target End Date:  1 to 3 days    1.  Alcohol history screening done on admission  2.  CIWA-AR score assessment (includes assessment of nausea/vomiting, tremor, sweats, anxiety, agitation, tactile, visual and auditory disturbances, headache and orientation/sensorium).  Document on CIWA flowsheet.  3.  Follow CIWA-AR score protocol  4.  Frequent reorientation  5.  Monitor for fluid and electrolyte imbalance.  6.  Assess for respiratory depression due to sedation (pulse ox)  7.  Consider thiamine, multivitamins, folic acid and magnesium sulfate per provider order  8.  Collaborate with Social Workers/Case Management  9.  Collaborate with mental health  Outcome: Progressing  Note: CIWA protocol in place. Scoring remains low.      Problem: Seizure Precautions  Goal: Implementation of seizure precautions  Description: Target End Date:  Prior to discharge or change in level  of care    1.  Padded side rails up at all times  2.  Suction equipment and oxygen delivery system at bedside  3.  Continuous pulse oximeter in use  4.  Implement fall precautions, bed alarm on, bed in lowest position  5.  IV access (per order)  6.  Provide low stimulus environment, avoid exposure to triggers  7.  Instruct patient to use call light/seizure button if having warning signs of impending seizure  Outcome: Progressing  Note: Seizure precautions in place      Problem: Psychosocial  Goal: Patient's level of anxiety will decrease  Description: Target End Date:  1-3 days or as soon as patient condition allows    1.  Collaborate with patient and family/caregiver to identify triggers and develop strategies to cope with anxiety  2.  Implement stimuli reduction, calming techniques  3.  Pharmacologic management per provider order  4.  Encourage patient/family/care giver participation  5.  Collaborate with interdisciplinary team including Psychologist or Behavioral Health Team as needed  Outcome: Progressing  Note: Patient neurologic status has improved significantly within shift. Able to communicate needs     Problem: Risk for Aspiration  Goal: Patient's risk for aspiration will be absent or decrease  Description: Target End Date:  Prior to discharge or change in level of care    1.   Complete dysphagia screening on admission  2.   NPO until dysphagia screening complete or medically cleared  3.   Collaborate with Speech Therapy, Clinical Dietitian and interdisciplinary team  4.   Implement aspiration precautions  5.   Assist patient up to chair for meals  6.   Elevate head of bed 90 degrees if patient is unable to get out of bed  7.   Encourage small bites  8.   Ensure foods/liquids are of appropriate consistency  9.   Assess for any signs/symptoms of aspiration  10. Assess breath sounds and vital signs after oral intake  Outcome: Progressing       Patient is not progressing towards the following goals:

## 2025-03-02 NOTE — PROGRESS NOTES
SARAH DIRECT ADMISSION REPORT  Transferring facility: [facility]  Transferring physician: LADAN Barney    Chief complaint: Seizure  Pertinent history & patient course:   69-year-old female with past medical history of alcohol abuse brought into the ED with seizure-like activity.  She had another witnessed seizure in the ED that lasted less than a minute, tonic-clonic activity and was given 2 mg of IV Ativan.  She was postictal afterwards for about an hour with a GCS of 7.  Not improved GCS about 10 but still very confused.    Initially she was loaded with Keppra, however upon chart review it was noted that patient has frequent ED presentations and admissions for alcohol abuse/withdrawal.  No recorded history of withdrawal seizures in the past.  Last drink was last night and alcohol level was 0.    CT head was negative, lactic acid was 2.8.    She was also given 130 mg of phenobarbital.    The admitting provider is the point of contact for questions or concerns regarding patient's care.

## 2025-03-03 ENCOUNTER — APPOINTMENT (OUTPATIENT)
Dept: RADIOLOGY | Facility: MEDICAL CENTER | Age: 70
DRG: 897 | End: 2025-03-03
Payer: MEDICARE

## 2025-03-03 LAB
ALBUMIN SERPL BCP-MCNC: 2.9 G/DL (ref 3.2–4.9)
ALBUMIN/GLOB SERPL: 1.1 G/DL
ALP SERPL-CCNC: 100 U/L (ref 30–99)
ALT SERPL-CCNC: 18 U/L (ref 2–50)
ANION GAP SERPL CALC-SCNC: 6 MMOL/L (ref 7–16)
AST SERPL-CCNC: 69 U/L (ref 12–45)
BASOPHILS # BLD AUTO: 0.6 % (ref 0–1.8)
BASOPHILS # BLD: 0.02 K/UL (ref 0–0.12)
BILIRUB SERPL-MCNC: 0.8 MG/DL (ref 0.1–1.5)
BUN SERPL-MCNC: 5 MG/DL (ref 8–22)
CALCIUM ALBUM COR SERPL-MCNC: 8.9 MG/DL (ref 8.5–10.5)
CALCIUM SERPL-MCNC: 8 MG/DL (ref 8.5–10.5)
CHLORIDE SERPL-SCNC: 112 MMOL/L (ref 96–112)
CO2 SERPL-SCNC: 18 MMOL/L (ref 20–33)
CREAT SERPL-MCNC: 0.76 MG/DL (ref 0.5–1.4)
EOSINOPHIL # BLD AUTO: 0.05 K/UL (ref 0–0.51)
EOSINOPHIL NFR BLD: 1.4 % (ref 0–6.9)
ERYTHROCYTE [DISTWIDTH] IN BLOOD BY AUTOMATED COUNT: 54.3 FL (ref 35.9–50)
GFR SERPLBLD CREATININE-BSD FMLA CKD-EPI: 83 ML/MIN/1.73 M 2
GLOBULIN SER CALC-MCNC: 2.7 G/DL (ref 1.9–3.5)
GLUCOSE SERPL-MCNC: 128 MG/DL (ref 65–99)
HCT VFR BLD AUTO: 24.5 % (ref 37–47)
HGB BLD-MCNC: 8 G/DL (ref 12–16)
IMM GRANULOCYTES # BLD AUTO: 0.01 K/UL (ref 0–0.11)
IMM GRANULOCYTES NFR BLD AUTO: 0.3 % (ref 0–0.9)
LYMPHOCYTES # BLD AUTO: 1.28 K/UL (ref 1–4.8)
LYMPHOCYTES NFR BLD: 35.3 % (ref 22–41)
MAGNESIUM SERPL-MCNC: 2.1 MG/DL (ref 1.5–2.5)
MCH RBC QN AUTO: 33.8 PG (ref 27–33)
MCHC RBC AUTO-ENTMCNC: 32.7 G/DL (ref 32.2–35.5)
MCV RBC AUTO: 103.4 FL (ref 81.4–97.8)
MONOCYTES # BLD AUTO: 0.57 K/UL (ref 0–0.85)
MONOCYTES NFR BLD AUTO: 15.7 % (ref 0–13.4)
NEUTROPHILS # BLD AUTO: 1.7 K/UL (ref 1.82–7.42)
NEUTROPHILS NFR BLD: 46.7 % (ref 44–72)
NRBC # BLD AUTO: 0 K/UL
NRBC BLD-RTO: 0 /100 WBC (ref 0–0.2)
PHOSPHATE SERPL-MCNC: 2.3 MG/DL (ref 2.5–4.5)
PLATELET # BLD AUTO: 139 K/UL (ref 164–446)
PMV BLD AUTO: 10.2 FL (ref 9–12.9)
POTASSIUM SERPL-SCNC: 4.1 MMOL/L (ref 3.6–5.5)
PROT SERPL-MCNC: 5.6 G/DL (ref 6–8.2)
RBC # BLD AUTO: 2.37 M/UL (ref 4.2–5.4)
SODIUM SERPL-SCNC: 136 MMOL/L (ref 135–145)
WBC # BLD AUTO: 3.6 K/UL (ref 4.8–10.8)

## 2025-03-03 PROCEDURE — 4A00X4Z MEASUREMENT OF CENTRAL NERVOUS ELECTRICAL ACTIVITY, EXTERNAL APPROACH: ICD-10-PCS | Performed by: STUDENT IN AN ORGANIZED HEALTH CARE EDUCATION/TRAINING PROGRAM

## 2025-03-03 PROCEDURE — 700102 HCHG RX REV CODE 250 W/ 637 OVERRIDE(OP)

## 2025-03-03 PROCEDURE — A9270 NON-COVERED ITEM OR SERVICE: HCPCS | Performed by: HOSPITALIST

## 2025-03-03 PROCEDURE — 700117 HCHG RX CONTRAST REV CODE 255: Mod: JZ

## 2025-03-03 PROCEDURE — 36415 COLL VENOUS BLD VENIPUNCTURE: CPT

## 2025-03-03 PROCEDURE — 99233 SBSQ HOSP IP/OBS HIGH 50: CPT | Mod: GC | Performed by: STUDENT IN AN ORGANIZED HEALTH CARE EDUCATION/TRAINING PROGRAM

## 2025-03-03 PROCEDURE — 95819 EEG AWAKE AND ASLEEP: CPT | Mod: 26 | Performed by: STUDENT IN AN ORGANIZED HEALTH CARE EDUCATION/TRAINING PROGRAM

## 2025-03-03 PROCEDURE — 70553 MRI BRAIN STEM W/O & W/DYE: CPT

## 2025-03-03 PROCEDURE — 770001 HCHG ROOM/CARE - MED/SURG/GYN PRIV*

## 2025-03-03 PROCEDURE — 97162 PT EVAL MOD COMPLEX 30 MIN: CPT

## 2025-03-03 PROCEDURE — 700111 HCHG RX REV CODE 636 W/ 250 OVERRIDE (IP): Mod: JZ | Performed by: HOSPITALIST

## 2025-03-03 PROCEDURE — 97166 OT EVAL MOD COMPLEX 45 MIN: CPT

## 2025-03-03 PROCEDURE — 700102 HCHG RX REV CODE 250 W/ 637 OVERRIDE(OP): Performed by: HOSPITALIST

## 2025-03-03 PROCEDURE — 85025 COMPLETE CBC W/AUTO DIFF WBC: CPT

## 2025-03-03 PROCEDURE — A9270 NON-COVERED ITEM OR SERVICE: HCPCS

## 2025-03-03 PROCEDURE — 700111 HCHG RX REV CODE 636 W/ 250 OVERRIDE (IP): Mod: JZ

## 2025-03-03 PROCEDURE — 95819 EEG AWAKE AND ASLEEP: CPT | Performed by: STUDENT IN AN ORGANIZED HEALTH CARE EDUCATION/TRAINING PROGRAM

## 2025-03-03 PROCEDURE — 700105 HCHG RX REV CODE 258: Performed by: HOSPITALIST

## 2025-03-03 PROCEDURE — 83735 ASSAY OF MAGNESIUM: CPT

## 2025-03-03 PROCEDURE — A9579 GAD-BASE MR CONTRAST NOS,1ML: HCPCS | Mod: JZ

## 2025-03-03 PROCEDURE — 80053 COMPREHEN METABOLIC PANEL: CPT

## 2025-03-03 PROCEDURE — 84100 ASSAY OF PHOSPHORUS: CPT

## 2025-03-03 RX ORDER — LORATADINE 10 MG/1
10 TABLET ORAL DAILY
COMMUNITY

## 2025-03-03 RX ORDER — AZELASTINE 1 MG/ML
1 SPRAY, METERED NASAL 2 TIMES DAILY
COMMUNITY

## 2025-03-03 RX ORDER — TRAZODONE HYDROCHLORIDE 100 MG/1
50 TABLET ORAL NIGHTLY
Status: DISCONTINUED | OUTPATIENT
Start: 2025-03-03 | End: 2025-03-04 | Stop reason: HOSPADM

## 2025-03-03 RX ORDER — GAUZE BANDAGE 2" X 2"
100 BANDAGE TOPICAL DAILY
Status: COMPLETED | OUTPATIENT
Start: 2025-03-04 | End: 2025-03-04

## 2025-03-03 RX ORDER — PAROXETINE 20 MG/1
20 TABLET, FILM COATED ORAL EVERY EVENING
Status: DISCONTINUED | OUTPATIENT
Start: 2025-03-04 | End: 2025-03-04 | Stop reason: HOSPADM

## 2025-03-03 RX ORDER — LEVETIRACETAM 500 MG/1
500 TABLET ORAL 2 TIMES DAILY
Status: DISCONTINUED | OUTPATIENT
Start: 2025-03-03 | End: 2025-03-04 | Stop reason: HOSPADM

## 2025-03-03 RX ORDER — TRAZODONE HYDROCHLORIDE 50 MG/1
50 TABLET ORAL NIGHTLY
COMMUNITY

## 2025-03-03 RX ORDER — PAROXETINE 10 MG/1
20 TABLET, FILM COATED ORAL DAILY
COMMUNITY

## 2025-03-03 RX ORDER — OMEPRAZOLE 20 MG/1
20 CAPSULE, DELAYED RELEASE ORAL DAILY
COMMUNITY

## 2025-03-03 RX ADMIN — OMEPRAZOLE 20 MG: 20 CAPSULE, DELAYED RELEASE ORAL at 06:23

## 2025-03-03 RX ADMIN — THIAMINE HYDROCHLORIDE 100 MG: 100 INJECTION, SOLUTION INTRAMUSCULAR; INTRAVENOUS at 06:30

## 2025-03-03 RX ADMIN — ENOXAPARIN SODIUM 40 MG: 100 INJECTION SUBCUTANEOUS at 17:05

## 2025-03-03 RX ADMIN — TRAZODONE HYDROCHLORIDE 50 MG: 100 TABLET ORAL at 20:34

## 2025-03-03 RX ADMIN — LEVETIRACETAM 500 MG: 100 INJECTION, SOLUTION INTRAVENOUS at 06:23

## 2025-03-03 RX ADMIN — SODIUM CHLORIDE: 9 INJECTION, SOLUTION INTRAVENOUS at 17:11

## 2025-03-03 RX ADMIN — CITALOPRAM HYDROBROMIDE 20 MG: 20 TABLET ORAL at 06:23

## 2025-03-03 RX ADMIN — THERA TABS 1 TABLET: TAB at 06:23

## 2025-03-03 RX ADMIN — IBUPROFEN 400 MG: 800 TABLET, FILM COATED ORAL at 00:19

## 2025-03-03 RX ADMIN — LORAZEPAM 0.5 MG: 1 TABLET ORAL at 01:18

## 2025-03-03 RX ADMIN — DIBASIC SODIUM PHOSPHATE, MONOBASIC POTASSIUM PHOSPHATE AND MONOBASIC SODIUM PHOSPHATE 250 MG: 852; 155; 130 TABLET ORAL at 06:30

## 2025-03-03 RX ADMIN — GADOTERIDOL 10 ML: 279.3 INJECTION, SOLUTION INTRAVENOUS at 04:45

## 2025-03-03 RX ADMIN — LEVETIRACETAM 500 MG: 500 TABLET, FILM COATED ORAL at 17:05

## 2025-03-03 RX ADMIN — SODIUM CHLORIDE: 9 INJECTION, SOLUTION INTRAVENOUS at 06:22

## 2025-03-03 ASSESSMENT — LIFESTYLE VARIABLES
TOTAL SCORE: 1
ORIENTATION AND CLOUDING OF SENSORIUM: ORIENTED AND CAN DO SERIAL ADDITIONS
PAROXYSMAL SWEATS: BARELY PERCEPTIBLE SWEATING. PALMS MOIST
HEADACHE, FULLNESS IN HEAD: NOT PRESENT
AGITATION: NORMAL ACTIVITY
ANXIETY: NO ANXIETY (AT EASE)
AUDITORY DISTURBANCES: NOT PRESENT
TREMOR: NO TREMOR
NAUSEA AND VOMITING: NO NAUSEA AND NO VOMITING
VISUAL DISTURBANCES: NOT PRESENT
SUBSTANCE_ABUSE: 1

## 2025-03-03 ASSESSMENT — COGNITIVE AND FUNCTIONAL STATUS - GENERAL
DAILY ACTIVITIY SCORE: 23
WALKING IN HOSPITAL ROOM: A LITTLE
WALKING IN HOSPITAL ROOM: A LITTLE
SUGGESTED CMS G CODE MODIFIER DAILY ACTIVITY: CI
SUGGESTED CMS G CODE MODIFIER MOBILITY: CJ
SUGGESTED CMS G CODE MODIFIER MOBILITY: CJ
MOBILITY SCORE: 22
MOBILITY SCORE: 22
CLIMB 3 TO 5 STEPS WITH RAILING: A LITTLE
HELP NEEDED FOR BATHING: A LITTLE
SUGGESTED CMS G CODE MODIFIER DAILY ACTIVITY: CI
DAILY ACTIVITIY SCORE: 23
CLIMB 3 TO 5 STEPS WITH RAILING: A LITTLE
DRESSING REGULAR LOWER BODY CLOTHING: A LITTLE

## 2025-03-03 ASSESSMENT — PAIN DESCRIPTION - PAIN TYPE
TYPE: ACUTE PAIN
TYPE: ACUTE PAIN

## 2025-03-03 ASSESSMENT — ENCOUNTER SYMPTOMS
DIZZINESS: 0
COUGH: 0
ABDOMINAL PAIN: 0
VOMITING: 0
FEVER: 0
PALPITATIONS: 0
CHILLS: 0
NAUSEA: 0
SHORTNESS OF BREATH: 0
MYALGIAS: 0

## 2025-03-03 ASSESSMENT — GAIT ASSESSMENTS
DISTANCE (FEET): 200
GAIT LEVEL OF ASSIST: SUPERVISED
DEVIATION: BRADYKINETIC

## 2025-03-03 ASSESSMENT — FIBROSIS 4 INDEX: FIB4 SCORE: 8.31

## 2025-03-03 ASSESSMENT — ACTIVITIES OF DAILY LIVING (ADL): TOILETING: INDEPENDENT

## 2025-03-03 NOTE — PROGRESS NOTES
Daily Progress Note:     Date of Service: 3/3/2025  Primary Team: [facility] Green Team   Attending: Dr. Castillo   Senior Resident: Dr. Marianne Palmer  Intern: Dr. Campo    Hospital Course:   70 yo F with PMHx depression, no h/o seizure who presented to OSH for confusion, slurred speech and transferred for alcohol withdrawal c/b seizure requiring ativan, phenobarbital and keppra. She was transferred here for higher level of care.  Upon interview with family, patient reports she drinks 3-4 beers/day but doesn't have h/o withdrawal or seizure. Was not significant drinking prior to seizure in ER. She will be continued on keppra. OSH w/u: s/p 2L IVF, ativan, phenobarbital, keppra and narcan. CT  and CTA head were negative for acute hemorrhage or vascular occlussion. Raphael placed at OSH that was removed upon arrival. WBC 6, Hgb 10, , INR 1.1, glucose 130, BUN 7, Cr 0.6, Na 138, K 3.8, CO2 16, AG 14, AST 55, ALT 23, , ammonia 26, UDS negative, Alcohol negative, EKG sinus tachy with LVH. Patient put on CIWA protocol on admission, MRI and EEG ordered. Her amitriptyline was held for concern it could lower the seizure threshold.  Her B12/Folate/Iron/TSH WNL. Anemia is in the setting of alcohol abuse.     Interval Update:  3/3  Patient denies any acute complaints   EEG and Brain MRI normal   CIWA 3, CIWA protocol discontinued    Consultants/Specialty:  N/a    Review of Systems:  Review of Systems   Constitutional:  Negative for chills and fever.   Respiratory:  Negative for cough and shortness of breath.    Cardiovascular:  Negative for chest pain and palpitations.   Gastrointestinal:  Negative for abdominal pain, nausea and vomiting.   Genitourinary:  Negative for dysuria and urgency.   Musculoskeletal:  Negative for myalgias.   Neurological:  Negative for dizziness.   Psychiatric/Behavioral:  Positive for substance abuse (alcohol).      Objective:   Vitals:   Temp:  [36 °C (96.8 °F)-36.3 °C (97.3 °F)] 36.2 °C (97.2  °F)  Pulse:  [71-84] 84  Resp:  [16-17] 16  BP: (114-123)/(51-58) 114/54  SpO2:  [93 %-98 %] 97 %    Physical Exam  Constitutional:       General: She is not in acute distress.     Appearance: She is not ill-appearing (chronic), toxic-appearing or diaphoretic.   HENT:      Head: Normocephalic and atraumatic.      Nose: Nose normal. No rhinorrhea.      Mouth/Throat:      Mouth: Mucous membranes are dry.      Pharynx: Oropharynx is clear.   Eyes:      General: No scleral icterus.     Conjunctiva/sclera: Conjunctivae normal.   Cardiovascular:      Rate and Rhythm: Normal rate and regular rhythm.   Pulmonary:      Effort: Pulmonary effort is normal. No respiratory distress.      Breath sounds: No wheezing.   Abdominal:      General: There is no distension.      Palpations: Abdomen is soft.      Tenderness: There is no abdominal tenderness. There is no guarding or rebound.   Musculoskeletal:      Cervical back: Normal range of motion and neck supple.      Right lower leg: No edema.      Left lower leg: No edema.   Skin:     General: Skin is warm and dry.       Labs:   Lab Results   Component Value Date/Time    WBC 3.6 (L) 03/03/2025 12:38 AM    RBC 2.37 (L) 03/03/2025 12:38 AM    HEMOGLOBIN 8.0 (L) 03/03/2025 12:38 AM    HEMATOCRIT 24.5 (L) 03/03/2025 12:38 AM    .4 (H) 03/03/2025 12:38 AM    MCH 33.8 (H) 03/03/2025 12:38 AM    MCHC 32.7 03/03/2025 12:38 AM    MPV 10.2 03/03/2025 12:38 AM    NEUTSPOLYS 46.70 03/03/2025 12:38 AM    LYMPHOCYTES 35.30 03/03/2025 12:38 AM    MONOCYTES 15.70 (H) 03/03/2025 12:38 AM    EOSINOPHILS 1.40 03/03/2025 12:38 AM    BASOPHILS 0.60 03/03/2025 12:38 AM      Lab Results   Component Value Date/Time    SODIUM 136 03/03/2025 12:38 AM    POTASSIUM 4.1 03/03/2025 12:38 AM    CHLORIDE 112 03/03/2025 12:38 AM    CO2 18 (L) 03/03/2025 12:38 AM    GLUCOSE 128 (H) 03/03/2025 12:38 AM    BUN 5 (L) 03/03/2025 12:38 AM    CREATININE 0.76 03/03/2025 12:38 AM      No results found for:  "\"BFIXW10S\", \"RELGHY750X\", \"XZLKN606O\", \"ARTHCO3\", \"ARTBE\", \"GAPBG\", \"NUR5PFO0\"    No results found for: \"PROTHROMBTM\", \"INR\"     Assessment and Plan:  * Seizure (HCC)  Assessment & Plan  The patient had 2 seizure events that lasted for less than 1 minute per OSH s/p keppra 1500mg, phenobarbital, lorazepam 2mg  Originally thought to be alcohol related due to OSH report of PMHx ETOH withdrawal c/b seizures and frequent ED visits for intoxication  CT and CTA head negative for acute hemorrhage or vascular occlussion  Reported CIWA 17 prior to transfer  After d/w family at bedside, reported that she does have 3-4 beers/day but has never had alcohol withdrawals, never had h/o seizures. Does have h/o multiple head injuries r/t falls. No h/o stroke, other PMHx besides chronic pain and depression. Only takes ibuprofen, amitriptyline and sleep aid OTC daily. No other significant PMHx. No h/o alcohol withdrawal. UDS negative.  Could be secondary to multiple head injuries, previous stroke, medications, alcohol  Continue keppra  EEG normal   MRI Brain normal   Q4h neurochecks    Hypokalemia  Assessment & Plan  Resolved   Replete and follow BMP     Anemia  Assessment & Plan  In setting of alcohol abuse. B12/Folate/Iron/TSH WNL   No active bleeding      Acute encephalopathy  Assessment & Plan  Likely r/t seizure w/ postictal s/p ativan, phenobarb, keppra  PO thiamine    Depression  Assessment & Plan  Holding amitriptyline since it can lower the seizure threshold  Continue citalopram        Code Status: FULL  DVT prophylaxis: enoxaparin  Diet: regular  GI: omeprazole  Disposition: remain inpatient  "

## 2025-03-03 NOTE — PROCEDURES
INPATIENT ROUTINE VIDEO ELECTROENCEPHALOGRAM REPORT    REFERRING PROVIDER: Adriane Castillo M.d.  DOS: 3/3/2025  STUDY DURATION: 0 hours and 31 minutes of total recording time.     INDICATION:  Vanessa Wong 69 y.o. female presenting with seizure(s)    RELEVANT MEDICATIONS/TREATMENTS:   Scheduled Medications   Medication Dose Frequency    citalopram  20 mg DAILY    thiamine  100 mg DAILY    multivitamin  1 Tablet DAILY    enoxaparin (LOVENOX) injection  40 mg DAILY AT 1800    levETIRAcetam (Keppra) IV  500 mg Q12HRS    omeprazole  20 mg DAILY       TECHNIQUE:   Routine VEEG was set up by a Neurodiagnostic technologist who performed education to the patient and staff. A minimum of 23 electrodes and 23 channel recording was setup and performed by Neurodiagnostic technologist, in accordance with the international 10-20 system. The study was reviewed in bipolar and referential montages. The recording examined the patient in the  awake, drowsy, and sleep state(s).     DESCRIPTION OF THE RECORD:  During wakefulness, the background was continuous and showed a 11 Hz posterior dominant rhythm.  There was reactivity to eye closure/opening.  An anterior-posterior gradient was noted with faster beta frequencies seen anteriorly.  During drowsiness, theta/delta frequencies were seen. Excess diffuse beta activity was present.     EEG Sleep: Sleep was captured and was characterized by diffuse background delta/theta activity with a loss of myogenic artifact.  N2 sleep transients in the form of sleep spindles and vertex waves were seen in the leads over the central regions.     ICTAL AND INTERICTAL FINDINGS:   No focal or generalized epileptiform activity noted.     No regional slowing or persistent focal asymmetries were seen.    No seizures.     ACTIVATION PROCEDURES:   Intermittent Photic stimulation was performed in a stepwise fashion from 1 to 30 Hz and did not elicited any abnormalities on EEG.     EKG: Sampling of the EKG  recording showed sinus rhythm    EVENTS:  None    INTERPRETATION:   Normal video EEG recording in the awake, drowsy, and sleep state(s):  - No regional slowing or persistent focal asymmetries were seen. Excess diffuse beta was present, a finding which may be seen in the setting of medication (eg. Benzodiazepine) use.   - No epileptiform discharges were seen.  - No seizures.       Note: This EEG does not rule out the possibility of seizures or exclude a diagnosis of epilepsy.  If the clinical suspicion remains high for seizures, a prolonged video EEG recording to capture clinical or subclinical events may be helpful.    Noreen Eli MD  Department of Neurology at [facility]  General Neurologist and Epileptologist   of Clinical Neurology, [facility].

## 2025-03-03 NOTE — CARE PLAN
The patient is Stable - Low risk of patient condition declining or worsening    Shift Goals  Clinical Goals: mental status, EEG  Patient Goals: rest  Family Goals: updates    Progress made toward(s) clinical / shift goals:    Problem: Knowledge Deficit - Standard  Goal: Patient and family/care givers will demonstrate understanding of plan of care, disease process/condition, diagnostic tests and medications  Description: Target End Date:  1-3 days or as soon as patient condition allows    Document in Patient Education    1.  Patient and family/caregiver oriented to unit, equipment, visitation policy and means for communicating concern  2.  Complete/review Learning Assessment  3.  Assess knowledge level of disease process/condition, treatment plan, diagnostic tests and medications  4.  Explain disease process/condition, treatment plan, diagnostic tests and medications  Outcome: Progressing  Note: Pt actively participates in POC. Pt and board updated, all questions and concerns answered. Pt encouraged to voice all questions and concerns.     Problem: Seizure Precautions  Goal: Implementation of seizure precautions  Description: Target End Date:  Prior to discharge or change in level of care    1.  Padded side rails up at all times  2.  Suction equipment and oxygen delivery system at bedside  3.  Continuous pulse oximeter in use  4.  Implement fall precautions, bed alarm on, bed in lowest position  5.  IV access (per order)  6.  Provide low stimulus environment, avoid exposure to triggers  7.  Instruct patient to use call light/seizure button if having warning signs of impending seizure  Outcome: Progressing  Note: Seizure precautions in place.      Problem: Fall Risk  Goal: Patient will remain free from falls  Description: Target End Date:  Prior to discharge or change in level of care    Document interventions on the Della Urrutia Fall Risk Assessment    1.  Assess for fall risk factors  2.  Implement fall  precautions  Outcome: Progressing  Note: Safety and fall education given. All fall precautions are in place with belongings at bedside table. Needs are tended to. Educated to use call light for assistance. Pt verbalized understanding.

## 2025-03-03 NOTE — THERAPY
"Physical Therapy   Initial Evaluation     Patient Name: Vanessa Wong  Age:  69 y.o., Sex:  female  Medical Record #: [id number]  Today's Date: 3/3/2025     Precautions  Precautions: Fall Risk    Assessment  Patient is a 69 y.o. female who was transferred from [place], [place] following a seizure thought to be withdrawal related. MRI and EEG negative on admission. PMH significant for chronic pain, depression.     Pt received in bed and agreeable to PT evaluation. Pt was able to mobilize at a supervised level with no AD. Pt mildly unsteady initially standing, but had no further losses of balance or notable unsteadiness. Pt reports feeling back to baseline with no further concerns. Anticipate no further PT needs.    Plan    Physical Therapy Initial Treatment Plan   Duration: Evaluation only    DC Equipment Recommendations: None  Discharge Recommendations: Anticipate that the patient will have no further physical therapy needs after discharge from the hospital     Subjective    \"I don't know if my son can come get me today\"     Objective       03/03/25 1332   Precautions   Precautions Fall Risk   Vitals   O2 Delivery Device None - Room Air   Pain 0 - 10 Group   Therapist Pain Assessment Post Activity Pain Same as Prior to Activity;Nurse Notified;0   Prior Living Situation   Housing / Facility 1 Story House   Steps Into Home 0   Steps In Home 1   Equipment Owned None   Lives with - Patient's Self Care Capacity   (Adult grandson)   Comments Pt reports her son can also assist if needed.   Prior Level of Functional Mobility   Bed Mobility Independent   Transfer Status Independent   Ambulation Independent   Assistive Devices Used None   Stairs Independent   History of Falls   History of Falls No   Date of Last Fall   (reports no falls in last 6 months)   Cognition    Level of Consciousness Alert   Comments Pleasant and cooperative   Active ROM Lower Body    Active ROM Lower Body  WDL   Strength Lower Body   Comments BLE grossly 4 " to 4+/5   Sensation Lower Body   Comments Denies LE sensory changes   Lower Body Muscle Tone   Lower Body Muscle Tone  WDL   Coordination Lower Body    Coordination Lower Body  WDL   Balance Assessment   Sitting Balance (Static) Fair +   Sitting Balance (Dynamic) Fair +   Standing Balance (Static) Fair   Standing Balance (Dynamic) Fair   Weight Shift Sitting Good   Weight Shift Standing Good   Comments no AD   Bed Mobility    Supine to Sit Supervised   Sit to Supine Supervised   Scooting Supervised   Rolling Supervised   Comments HOB slightly elevated, no difficulty   Gait Analysis   Gait Level Of Assist Supervised   Assistive Device None   Distance (Feet) 200   # of Times Distance was Traveled 1   Deviation Bradykinetic   Comments No overt losses of balance   Functional Mobility   Sit to Stand Supervised   Bed, Chair, Wheelchair Transfer Supervised   Toilet Transfers Supervised   Mobility up with no AD   6 Clicks Assessment - How much HELP from from another person do you currently need... (If the patient hasn't done an activity recently, how much help from another person do you think he/she would need if he/she tried?)   Turning from your back to your side while in a flat bed without using bedrails? 4   Moving from lying on your back to sitting on the side of a flat bed without using bedrails? 4   Moving to and from a bed to a chair (including a wheelchair)? 4   Standing up from a chair using your arms (e.g., wheelchair, or bedside chair)? 4   Walking in hospital room? 3   Climbing 3-5 steps with a railing? 3   6 clicks Mobility Score 22   Education Group   Education Provided Role of Physical Therapist   Role of Physical Therapist Patient Response Patient;Acceptance;Explanation;Verbal Demonstration   Physical Therapy Initial Treatment Plan    Duration Evaluation only   Anticipated Discharge Equipment and Recommendations   DC Equipment Recommendations None   Discharge Recommendations Anticipate that the patient will  have no further physical therapy needs after discharge from the hospital   Interdisciplinary Plan of Care Collaboration   IDT Collaboration with  Nursing   Patient Position at End of Therapy In Bed;Bed Alarm On;Call Light within Reach;Tray Table within Reach;Phone within Reach   Collaboration Comments RN updated   Session Information   Date / Session Number  3/3-eval only

## 2025-03-03 NOTE — PROGRESS NOTES
Monitor summary:     Rhythm : SR  Rate : 72-83  Ectopy : NONE    KY=.18  QRS=.07  QT=.46        Per telemetry strip summary from monitor room.

## 2025-03-04 ENCOUNTER — PHARMACY VISIT (OUTPATIENT)
Dept: PHARMACY | Facility: MEDICAL CENTER | Age: 70
End: 2025-03-04
Payer: COMMERCIAL

## 2025-03-04 VITALS
DIASTOLIC BLOOD PRESSURE: 51 MMHG | OXYGEN SATURATION: 98 % | HEART RATE: 81 BPM | BODY MASS INDEX: 20.77 KG/M2 | HEIGHT: 61 IN | WEIGHT: 110.01 LBS | SYSTOLIC BLOOD PRESSURE: 117 MMHG | TEMPERATURE: 97.5 F | RESPIRATION RATE: 16 BRPM

## 2025-03-04 LAB
ALBUMIN SERPL BCP-MCNC: 2.8 G/DL (ref 3.2–4.9)
ALBUMIN/GLOB SERPL: 1 G/DL
ALP SERPL-CCNC: 106 U/L (ref 30–99)
ALT SERPL-CCNC: 23 U/L (ref 2–50)
ANION GAP SERPL CALC-SCNC: 9 MMOL/L (ref 7–16)
AST SERPL-CCNC: 69 U/L (ref 12–45)
BASOPHILS # BLD AUTO: 0.2 % (ref 0–1.8)
BASOPHILS # BLD: 0.01 K/UL (ref 0–0.12)
BILIRUB SERPL-MCNC: 0.7 MG/DL (ref 0.1–1.5)
BUN SERPL-MCNC: 4 MG/DL (ref 8–22)
CALCIUM ALBUM COR SERPL-MCNC: 8.8 MG/DL (ref 8.5–10.5)
CALCIUM SERPL-MCNC: 7.8 MG/DL (ref 8.5–10.5)
CHLORIDE SERPL-SCNC: 111 MMOL/L (ref 96–112)
CO2 SERPL-SCNC: 17 MMOL/L (ref 20–33)
CREAT SERPL-MCNC: 0.73 MG/DL (ref 0.5–1.4)
EOSINOPHIL # BLD AUTO: 0.05 K/UL (ref 0–0.51)
EOSINOPHIL NFR BLD: 1.1 % (ref 0–6.9)
ERYTHROCYTE [DISTWIDTH] IN BLOOD BY AUTOMATED COUNT: 52.1 FL (ref 35.9–50)
GFR SERPLBLD CREATININE-BSD FMLA CKD-EPI: 88 ML/MIN/1.73 M 2
GLOBULIN SER CALC-MCNC: 2.7 G/DL (ref 1.9–3.5)
GLUCOSE SERPL-MCNC: 103 MG/DL (ref 65–99)
HCT VFR BLD AUTO: 24 % (ref 37–47)
HGB BLD-MCNC: 8.1 G/DL (ref 12–16)
IMM GRANULOCYTES # BLD AUTO: 0.01 K/UL (ref 0–0.11)
IMM GRANULOCYTES NFR BLD AUTO: 0.2 % (ref 0–0.9)
LYMPHOCYTES # BLD AUTO: 1.52 K/UL (ref 1–4.8)
LYMPHOCYTES NFR BLD: 34.9 % (ref 22–41)
MCH RBC QN AUTO: 34.8 PG (ref 27–33)
MCHC RBC AUTO-ENTMCNC: 33.8 G/DL (ref 32.2–35.5)
MCV RBC AUTO: 103 FL (ref 81.4–97.8)
MONOCYTES # BLD AUTO: 0.58 K/UL (ref 0–0.85)
MONOCYTES NFR BLD AUTO: 13.3 % (ref 0–13.4)
NEUTROPHILS # BLD AUTO: 2.18 K/UL (ref 1.82–7.42)
NEUTROPHILS NFR BLD: 50.3 % (ref 44–72)
NRBC # BLD AUTO: 0 K/UL
NRBC BLD-RTO: 0 /100 WBC (ref 0–0.2)
PHOSPHATE SERPL-MCNC: 2.6 MG/DL (ref 2.5–4.5)
PLATELET # BLD AUTO: 132 K/UL (ref 164–446)
PMV BLD AUTO: 10.3 FL (ref 9–12.9)
POTASSIUM SERPL-SCNC: 4 MMOL/L (ref 3.6–5.5)
PROT SERPL-MCNC: 5.5 G/DL (ref 6–8.2)
RBC # BLD AUTO: 2.33 M/UL (ref 4.2–5.4)
SODIUM SERPL-SCNC: 137 MMOL/L (ref 135–145)
WBC # BLD AUTO: 4.4 K/UL (ref 4.8–10.8)

## 2025-03-04 PROCEDURE — 700102 HCHG RX REV CODE 250 W/ 637 OVERRIDE(OP)

## 2025-03-04 PROCEDURE — 700102 HCHG RX REV CODE 250 W/ 637 OVERRIDE(OP): Performed by: STUDENT IN AN ORGANIZED HEALTH CARE EDUCATION/TRAINING PROGRAM

## 2025-03-04 PROCEDURE — 99239 HOSP IP/OBS DSCHRG MGMT >30: CPT | Mod: GC | Performed by: HOSPITALIST

## 2025-03-04 PROCEDURE — 84100 ASSAY OF PHOSPHORUS: CPT

## 2025-03-04 PROCEDURE — A9270 NON-COVERED ITEM OR SERVICE: HCPCS

## 2025-03-04 PROCEDURE — 36415 COLL VENOUS BLD VENIPUNCTURE: CPT

## 2025-03-04 PROCEDURE — A9270 NON-COVERED ITEM OR SERVICE: HCPCS | Performed by: STUDENT IN AN ORGANIZED HEALTH CARE EDUCATION/TRAINING PROGRAM

## 2025-03-04 PROCEDURE — RXMED WILLOW AMBULATORY MEDICATION CHARGE

## 2025-03-04 PROCEDURE — 85025 COMPLETE CBC W/AUTO DIFF WBC: CPT

## 2025-03-04 PROCEDURE — 80053 COMPREHEN METABOLIC PANEL: CPT

## 2025-03-04 RX ORDER — LANOLIN ALCOHOL/MO/W.PET/CERES
100 CREAM (GRAM) TOPICAL DAILY
Qty: 30 TABLET | Refills: 0 | Status: SHIPPED | OUTPATIENT
Start: 2025-03-04

## 2025-03-04 RX ORDER — LEVETIRACETAM 500 MG/1
500 TABLET ORAL 2 TIMES DAILY
Qty: 60 TABLET | Refills: 0 | Status: SHIPPED | OUTPATIENT
Start: 2025-03-04

## 2025-03-04 RX ADMIN — IBUPROFEN 400 MG: 800 TABLET, FILM COATED ORAL at 14:14

## 2025-03-04 RX ADMIN — Medication 100 MG: at 05:02

## 2025-03-04 RX ADMIN — THERA TABS 1 TABLET: TAB at 05:02

## 2025-03-04 RX ADMIN — OMEPRAZOLE 20 MG: 20 CAPSULE, DELAYED RELEASE ORAL at 05:02

## 2025-03-04 RX ADMIN — LEVETIRACETAM 500 MG: 500 TABLET, FILM COATED ORAL at 05:02

## 2025-03-04 SDOH — ECONOMIC STABILITY: TRANSPORTATION INSECURITY
IN THE PAST 12 MONTHS, HAS LACK OF RELIABLE TRANSPORTATION KEPT YOU FROM MEDICAL APPOINTMENTS, MEETINGS, WORK OR FROM GETTING THINGS NEEDED FOR DAILY LIVING?: NO

## 2025-03-04 SDOH — ECONOMIC STABILITY: TRANSPORTATION INSECURITY
IN THE PAST 12 MONTHS, HAS THE LACK OF TRANSPORTATION KEPT YOU FROM MEDICAL APPOINTMENTS OR FROM GETTING MEDICATIONS?: NO

## 2025-03-04 ASSESSMENT — SOCIAL DETERMINANTS OF HEALTH (SDOH)
IN THE PAST 12 MONTHS, HAS THE ELECTRIC, GAS, OIL, OR WATER COMPANY THREATENED TO SHUT OFF SERVICE IN YOUR HOME?: NO
WITHIN THE PAST 12 MONTHS, YOU WORRIED THAT YOUR FOOD WOULD RUN OUT BEFORE YOU GOT THE MONEY TO BUY MORE: NEVER TRUE
WITHIN THE LAST YEAR, HAVE YOU BEEN AFRAID OF YOUR PARTNER OR EX-PARTNER?: NO
WITHIN THE LAST YEAR, HAVE TO BEEN RAPED OR FORCED TO HAVE ANY KIND OF SEXUAL ACTIVITY BY YOUR PARTNER OR EX-PARTNER?: NO
WITHIN THE LAST YEAR, HAVE YOU BEEN HUMILIATED OR EMOTIONALLY ABUSED IN OTHER WAYS BY YOUR PARTNER OR EX-PARTNER?: NO
WITHIN THE PAST 12 MONTHS, THE FOOD YOU BOUGHT JUST DIDN'T LAST AND YOU DIDN'T HAVE MONEY TO GET MORE: NEVER TRUE
WITHIN THE LAST YEAR, HAVE YOU BEEN KICKED, HIT, SLAPPED, OR OTHERWISE PHYSICALLY HURT BY YOUR PARTNER OR EX-PARTNER?: NO

## 2025-03-04 ASSESSMENT — PAIN DESCRIPTION - PAIN TYPE
TYPE: ACUTE PAIN
TYPE: ACUTE PAIN

## 2025-03-04 NOTE — PROGRESS NOTES
Patient explained discharge paperwork, patient verbalized understanding. Patients meds to beds delivered to bedside. Patients IV removed. Patient dressed and toileted prior to son arrival. Patients belongings present on admission were present at discharge. Patient denies any further questions. RN called son to come  patient.

## 2025-03-04 NOTE — DISCHARGE INSTRUCTIONS
DO NOT TAKE AMITRIPTYLINE BECAUSE IT CAN INCREASE YOUR RISK FOR SEIZURES    Follow up with your primary care provider regarding this    Follow up with neurology

## 2025-03-04 NOTE — CARE PLAN
The patient is Stable - Low risk of patient condition declining or worsening    Shift Goals  Clinical Goals: dc tomorrow  Patient Goals: dc tomorrow  Family Goals: federico    Progress made toward(s) clinical / shift goals:    Problem: Knowledge Deficit - Standard  Goal: Patient and family/care givers will demonstrate understanding of plan of care, disease process/condition, diagnostic tests and medications  Outcome: Progressing  Note: Patient education on possible dc today. Patient motivated to go home.     Problem: Fall Risk  Goal: Patient will remain free from falls  Outcome: Progressing  Note: Patient calls before getting out of bed. Patient ambulatory to restroom. Patient has fall risk precautions in place        Patient is not progressing towards the following goals:

## 2025-03-04 NOTE — DISCHARGE SUMMARY
[facility] Internal Medicine Discharge Summary    Attending: Dr. Trejo  Senior Resident: Dr. Palmer  Intern:  Dr. Campo  Contact Number: [phone]    CHIEF COMPLAINT ON ADMISSION  Seizures    Reason for Admission  Seizures     Admission Date  3/2/2025    CODE STATUS  Full Code    HPI & HOSPITAL COURSE  70 yo F with PMHx depression, no h/o seizure who presented to OSH for confusion, slurred speech and transferred for alcohol withdrawal c/b seizure requiring ativan, phenobarbital and keppra. She was transferred here for higher level of care.     OSH w/u: s/p 2L IVF, ativan, phenobarbital, keppra and narcan.  CT  and CTA head were negative for acute hemorrhage or vascular occlussion. Raphael placed at OSH that was removed upon arrival.  WBC 6, Hgb 10, , INR 1.1, glucose 130, BUN 7, Cr 0.6, Na 138, K 3.8, CO2 16, AG 14, AST 55, ALT 23, , ammonia 26  UDS negative  Alcohol negative  EKG sinus tachy with LVH    Upon interview with family, patient reports she drinks 3-4 beers/day but doesn't have h/o withdrawal or seizure. Patient and family deny that she was significantly drinking prior to seizure in ER. Due to patient and family report denying heavy alcohol use causing seizure, further w/u with MRI brain and EEG done, which were both negative for any abnormalities. Neuro exam no focal deficits. She was continued on keppra. CIWA was discontinued inpatient due to low scores and did not require any ativan. She was treated with IM thiamine, IVF and multivitamin.    Review of home medications shows that patient takes amitriptyline for depression, and was told to discontinue due to it that could lower risk for seizure.    She was discussed with at length to stop all alcohol, based on her abnormal CBC findings of high MCV, RDW, anemia and low PLT count. Discussed with patient at length about resources in the community for alcohol cessation.    She will continue keppra, thiamine and multivitamin on discharge. She  will follow up with neurology and also her PCP.    Prior to discharge, she was ambulating without difficulty and eating. PT/OT evaluated her without any discharge needs.    Therefore, she is discharged in fair and stable condition to home with close outpatient follow-up.    The patient met 2-midnight criteria for an inpatient stay at the time of discharge.    Discharge Date  3/4/2025    Physical Exam on Day of Discharge  Physical Exam  Constitutional:       General: She is not in acute distress.     Appearance: She is ill-appearing (chronic). She is not toxic-appearing or diaphoretic.   HENT:      Head: Normocephalic and atraumatic.      Nose: Nose normal. No rhinorrhea.      Mouth/Throat:      Mouth: Mucous membranes are moist.      Pharynx: Oropharynx is clear.   Eyes:      Extraocular Movements: Extraocular movements intact.      Conjunctiva/sclera: Conjunctivae normal.   Cardiovascular:      Rate and Rhythm: Normal rate and regular rhythm.   Pulmonary:      Effort: Pulmonary effort is normal. No respiratory distress.      Breath sounds: No wheezing.   Abdominal:      General: There is no distension.      Palpations: Abdomen is soft.      Tenderness: There is no abdominal tenderness. There is no guarding or rebound.   Musculoskeletal:      Cervical back: Normal range of motion and neck supple.      Right lower leg: No edema.      Left lower leg: No edema.   Skin:     General: Skin is warm and dry.   Neurological:      General: No focal deficit present.      Mental Status: She is alert and oriented to person, place, and time. Mental status is at baseline.   Psychiatric:         Mood and Affect: Mood normal.         Behavior: Behavior normal.       FOLLOW UP ITEMS POST DISCHARGE  N/a    DISCHARGE DIAGNOSES  Principal Problem:    Seizure (HCC) (POA: Unknown)  Active Problems:    Acute encephalopathy (POA: Unknown)    Anemia (POA: Unknown)    Hypokalemia (POA: Unknown)    Depression (POA: Unknown)  Resolved  Problems:    Alcohol withdrawal seizure, with delirium (HCC) (POA: Yes)    FOLLOW UP  Follow up with your primary care provider  Follow up with neurology    MEDICATIONS ON DISCHARGE     Medication List        START taking these medications        Instructions   levETIRAcetam 500 MG Tabs  Commonly known as: Keppra   Take 1 Tablet by mouth 2 times a day.  Dose: 500 mg     One-Daily Multi-Vitamin Tabs  Start taking on: March 5, 2025   Take 1 Tablet by mouth every day.  Dose: 1 Tablet     thiamine 100 MG tablet  Commonly known as: Thiamine   Take 1 Tablet by mouth every day.  Dose: 100 mg            CONTINUE taking these medications        Instructions   azelastine 137 MCG/SPRAY nasal spray  Commonly known as: Astelin   Administer 1 Spray into affected nostril(S) 2 times a day.  Dose: 1 Spray     loratadine 10 MG Tabs  Commonly known as: Claritin   Take 10 mg by mouth every day.  Dose: 10 mg     omeprazole 20 MG delayed-release capsule  Commonly known as: PriLOSEC   Take 20 mg by mouth every day.  Dose: 20 mg     PARoxetine 10 MG Tabs  Commonly known as: Paxil   Take 20 mg by mouth every day.  Dose: 20 mg     traZODone 50 MG Tabs  Commonly known as: Desyrel   Take 50 mg by mouth every evening.  Dose: 50 mg            Allergies  Allergies   Allergen Reactions    Latex     Penicillins Unspecified     Patient unable to specify    Sulfur      DIET  Orders Placed This Encounter   Procedures    Diet Order Diet: Regular     Standing Status:   Standing     Number of Occurrences:   1     Diet::   Regular [1]     ACTIVITY  As tolerated.  Weight bearing as tolerated    CONSULTATIONS  N/a    PROCEDURES  3/3 EEG  Normal video EEG recording in the awake, drowsy, and sleep state(s):  - No regional slowing or persistent focal asymmetries were seen. Excess diffuse beta was present, a finding which may be seen in the setting of medication (eg. Benzodiazepine) use.   - No epileptiform discharges were seen.  - No seizures.       LABORATORY  Lab Results   Component Value Date    SODIUM 137 03/04/2025    POTASSIUM 4.0 03/04/2025    CHLORIDE 111 03/04/2025    CO2 17 (L) 03/04/2025    GLUCOSE 103 (H) 03/04/2025    BUN 4 (L) 03/04/2025    CREATININE 0.73 03/04/2025      Lab Results   Component Value Date    WBC 4.4 (L) 03/04/2025    HEMOGLOBIN 8.1 (L) 03/04/2025    HEMATOCRIT 24.0 (L) 03/04/2025    PLATELETCT 132 (L) 03/04/2025      Total time of the discharge process exceeds 32 minutes.

## 2025-03-04 NOTE — DISCHARGE PLANNING
Care Transition Team Assessment    LSW spoke with pt at bedside to complete assessment. Pt verified the information on the face sheet and reported she goes to [facility] for primary care. At baseline pt is independent with all ADLs and IADLs. Pt does not use any DME and drives independently. Pt reported she lives with her 21 year old grandson in a single story home that has no steps to enter. Pt has good DC support from her grandson, son in [place], and daughter in [place]. Pt reported she is retired and receives about $1,000/month in senior living. Pt has a history of etoh, however denied any concerns or desire for resources. Pt confirmed that her family will be able to provide transportation home upon DC.    Information Source  Orientation Level: Oriented X4  Information Given By: Patient  Informant's Name: Vanessa Wong  Who is responsible for making decisions for patient? : Patient    Readmission Evaluation  Is this a readmission?: No    Elopement Risk  Legal Hold: No  Ambulatory or Self Mobile in Wheelchair: Yes  Disoriented: No  Psychiatric Symptoms: None  History of Wandering: No  Elopement this Admit: No  Vocalizing Wanting to Leave: No  Displays Behaviors, Body Language Wanting to Leave: No-Not at Risk for Elopement  Elopement Risk: Not at Risk for Elopement    Interdisciplinary Discharge Planning  Lives with - Patient's Self Care Capacity: Related Adult  Housing / Facility: [address] [facility]  Prior Services: None    Discharge Preparedness  What is your plan after discharge?: Home with help  What are your discharge supports?: Child, Other (comment) (grandson)  Prior Functional Level: Ambulatory, Drives Self, Independent with Activities of Daily Living, Independent with Medication Management  Difficulity with ADLs: None  Difficulity with IADLs: None    Functional Assesment  Prior Functional Level: Ambulatory, Drives Self, Independent with Activities of Daily Living, Independent with Medication  Management    Finances  Financial Barriers to Discharge: No  Prescription Coverage: Yes    Advance Directive  Advance Directive?: None  Advance Directive offered?: AD Booklet refused    Psychological Assessment  History of Substance Abuse: Alcohol  History of Psychiatric Problems: No  Non-compliant with Treatment: No  Newly Diagnosed Illness: No    Discharge Risks or Barriers  Discharge risks or barriers?: Substance abuse  Patient risk factors: Substance abuse    Anticipated Discharge Information  Discharge Disposition: Discharged to home/self care (01)  Discharge Address: [address] [place] [address]  Discharge Contact Phone Number: [phone]

## 2025-04-30 NOTE — THERAPY
"Occupational Therapy   Initial Evaluation     Patient Name: Vanessa Wong  Age:  69 y.o., Sex:  female  Medical Record #: [id number]  Today's Date: 3/3/2025     Precautions  Precautions: (P) Fall Risk    Assessment    Patient is a very pleasant 69 y.o. female with a PMHx significant for chronic pain, EtOH abuse and depression. Presented to the hospital as a transfer from [place], [place] for HLOC following seizure-like activity. CTA and CT showed no acute hemorrhages and no vascular occlusion. MRI brain negative. Pt greeted and seen for OT eval. Completed ADLs/transfers with no more than SBA and functional ambulation without AD. Pt feels she has good support from family and has no concerns regarding DC home once medically cleared. No further acute OT needs at this time. Patient will not be actively followed for occupational therapy services at this time, however may be seen if requested by physician for 1 more visit within 30 days to address any discharge or equipment needs.     Plan    Occupational Therapy Initial Treatment Plan   Duration: (P) Discharge Needs Only    DC Equipment Recommendations: (P) Tub / Shower Seat  Discharge Recommendations: (P) Anticipate that the patient will have no further occupational therapy needs after discharge from the hospital     Subjective    \"I'll be staying there until my daughter kicks me out.\"  \"Staying with her was her idea. She just told me that's what we were going to do.\"     Objective     03/03/25 1425   Prior Living Situation   Prior Services None   Housing / Facility 1 Story House   Steps Into Home 0   Bathroom Set up Bathtub / Shower Combination;Shower Curtain   Equipment Owned None   Lives with - Patient's Self Care Capacity Related Adult   Comments Pt lives with her grandson who is not currently working and is able/available to assist PRN. Pt reports plan is for her to DC to daughters home in [place]. Daughter has a single story home with a tub/shower. Pt states someone will " be home most of the time to assist PRN.   Prior Level of ADL Function   Self Feeding Independent   Grooming / Hygiene Independent   Bathing Independent   Dressing Independent   Toileting Independent   Prior Level of IADL Function   Medication Management Independent   Laundry Independent   Kitchen Mobility Independent   Finances Independent   Home Management Independent   Shopping Independent   Prior Level Of Mobility Independent Without Device in Community;Independent Without Device in Home   Driving / Transportation Driving Independent   Occupation (Pre-Hospital Vocational) Retired Due To Age  (Worked for [employer] 27 years)   History of Falls   History of Falls No   Precautions   Precautions Fall Risk   Vitals   O2 Delivery Device None - Room Air   Pain 0 - 10 Group   Therapist Pain Assessment During Activity;Post Activity Pain Same as Prior to Activity;Nurse Notified  (No c/o pain)   Cognition    Level of Consciousness Alert   Comments Pleasant and participatory. Tendency to move quickly despite cues to slow down.   Active ROM Upper Body   Active ROM Upper Body  X   Dominant Hand Right   Comments R elbow extension limited by pain and edema.   Strength Upper Body   Upper Body Strength  X   Gross Strength Generalized Weakness, Equal Bilaterally.    Coordination Upper Body   Coordination WDL   Balance Assessment   Sitting Balance (Static) Fair +   Sitting Balance (Dynamic) Fair +   Standing Balance (Static) Fair   Standing Balance (Dynamic) Fair   Weight Shift Sitting Good   Weight Shift Standing Good   Comments No AD   Bed Mobility    Supine to Sit Supervised   Sit to Supine Supervised   Scooting Supervised   Rolling Supervised   Comments HOB slightly elevated with no use of rail   ADL Assessment   Eating Supervision   Grooming Standby Assist;Standing  (oral hygiene at sink)   Upper Body Dressing Supervision   Lower Body Dressing Supervision   Toileting   (Declined need; reports no difficulty.)   Functional Mobility    Sit to Stand Supervised   Bed, Chair, Wheelchair Transfer Supervised   Toilet Transfers   (Declined need)   Transfer Method Stand Step   Mobility No AD; bed > sink > bed   Visual Perception   Comments Denies changes   Edema / Skin Assessment   Edema / Skin  X   Comments Scattered contusions all extremities   Activity Tolerance   Comments Limited by weakness and fatigue   Education Group   Education Provided Role of Occupational Therapist;Home Safety   Role of Occupational Therapist Patient Response Patient;Acceptance;Explanation;Verbal Demonstration   Home Safety Patient Response Patient;Acceptance;Explanation;Verbal Demonstration;Reinforcement Needed  (Educated on benefit of shower chair for all bathing ADLs to assist with energy conservation and fall reduction while recovering)   Occupational Therapy Initial Treatment Plan    Duration Discharge Needs Only   Anticipated Discharge Equipment and Recommendations   DC Equipment Recommendations Tub / Shower Seat   Discharge Recommendations Anticipate that the patient will have no further occupational therapy needs after discharge from the hospital   Interdisciplinary Plan of Care Collaboration   IDT Collaboration with  Nursing   Patient Position at End of Therapy In Bed;Bed Alarm On;Call Light within Reach;Tray Table within Reach;Phone within Reach   Collaboration Comments OT report and recs   Session Information   Date / Session Number  3/3 - DC needs only          3 - Moderate disability. Requires some help, but able to walk unassisted.